# Patient Record
Sex: MALE | Race: WHITE | NOT HISPANIC OR LATINO | Employment: FULL TIME | URBAN - METROPOLITAN AREA
[De-identification: names, ages, dates, MRNs, and addresses within clinical notes are randomized per-mention and may not be internally consistent; named-entity substitution may affect disease eponyms.]

---

## 2017-01-26 ENCOUNTER — TELEPHONE (OUTPATIENT)
Dept: INTERNAL MEDICINE | Facility: CLINIC | Age: 47
End: 2017-01-26

## 2017-01-26 DIAGNOSIS — Z00.00 ROUTINE GENERAL MEDICAL EXAMINATION AT A HEALTH CARE FACILITY: Primary | ICD-10-CM

## 2017-01-30 NOTE — TELEPHONE ENCOUNTER
Patient has to pay out of pocket right now so can you he just have the TSH.  I will call and find out the price.  Please advise.

## 2017-01-31 ENCOUNTER — LAB VISIT (OUTPATIENT)
Dept: LAB | Facility: HOSPITAL | Age: 47
End: 2017-01-31
Attending: INTERNAL MEDICINE
Payer: COMMERCIAL

## 2017-01-31 ENCOUNTER — TELEPHONE (OUTPATIENT)
Dept: INTERNAL MEDICINE | Facility: CLINIC | Age: 47
End: 2017-01-31

## 2017-01-31 DIAGNOSIS — Z00.00 ROUTINE GENERAL MEDICAL EXAMINATION AT A HEALTH CARE FACILITY: ICD-10-CM

## 2017-01-31 LAB — TSH SERPL DL<=0.005 MIU/L-ACNC: 1.97 UIU/ML

## 2017-01-31 PROCEDURE — 36415 COLL VENOUS BLD VENIPUNCTURE: CPT | Mod: PO

## 2017-01-31 PROCEDURE — 84443 ASSAY THYROID STIM HORMONE: CPT

## 2017-01-31 NOTE — TELEPHONE ENCOUNTER
----- Message from Dana Chavarria sent at 1/31/2017 10:25 AM CST -----  Contact: pt  Pt returning missed call, pt can be reached at [phone]//Bob

## 2017-01-31 NOTE — TELEPHONE ENCOUNTER
Patient notified the listed price for [facility] is $102 for the TSH.  Patient verbalized understanding.  Patient is coming in today to have done.

## 2017-06-17 ENCOUNTER — OFFICE VISIT (OUTPATIENT)
Dept: URGENT CARE | Facility: CLINIC | Age: 47
End: 2017-06-17

## 2017-06-17 VITALS
TEMPERATURE: 99 F | HEART RATE: 74 BPM | WEIGHT: 276.69 LBS | SYSTOLIC BLOOD PRESSURE: 140 MMHG | DIASTOLIC BLOOD PRESSURE: 89 MMHG | HEIGHT: 77 IN | OXYGEN SATURATION: 98 % | BODY MASS INDEX: 32.67 KG/M2

## 2017-06-17 DIAGNOSIS — G43.011 INTRACTABLE MIGRAINE WITHOUT AURA AND WITH STATUS MIGRAINOSUS: ICD-10-CM

## 2017-06-17 DIAGNOSIS — R11.10 INTRACTABLE VOMITING, PRESENCE OF NAUSEA NOT SPECIFIED, UNSPECIFIED VOMITING TYPE: Primary | ICD-10-CM

## 2017-06-17 DIAGNOSIS — R10.13 DYSPEPSIA: ICD-10-CM

## 2017-06-17 PROCEDURE — 96372 THER/PROPH/DIAG INJ SC/IM: CPT | Mod: PBBFAC,PO

## 2017-06-17 PROCEDURE — 99999 PR PBB SHADOW E&M-EST. PATIENT-LVL III: CPT | Mod: PBBFAC,,, | Performed by: PHYSICIAN ASSISTANT

## 2017-06-17 PROCEDURE — 99213 OFFICE O/P EST LOW 20 MIN: CPT | Mod: PBBFAC,PO | Performed by: PHYSICIAN ASSISTANT

## 2017-06-17 PROCEDURE — 99213 OFFICE O/P EST LOW 20 MIN: CPT | Mod: S$PBB,,, | Performed by: PHYSICIAN ASSISTANT

## 2017-06-17 RX ORDER — PROMETHAZINE HYDROCHLORIDE 25 MG/ML
25 INJECTION, SOLUTION INTRAMUSCULAR; INTRAVENOUS
Status: COMPLETED | OUTPATIENT
Start: 2017-06-17 | End: 2017-06-17

## 2017-06-17 RX ORDER — PHENOL/SODIUM PHENOLATE
20 AEROSOL, SPRAY (ML) MUCOUS MEMBRANE DAILY
COMMUNITY
Start: 2017-06-17 | End: 2020-08-10

## 2017-06-17 RX ORDER — PROMETHAZINE HYDROCHLORIDE 12.5 MG/1
12.5 TABLET ORAL EVERY 6 HOURS PRN
Qty: 12 TABLET | Refills: 0 | Status: SHIPPED | OUTPATIENT
Start: 2017-06-17 | End: 2018-07-20

## 2017-06-17 RX ORDER — KETOROLAC TROMETHAMINE 30 MG/ML
60 INJECTION, SOLUTION INTRAMUSCULAR; INTRAVENOUS
Status: COMPLETED | OUTPATIENT
Start: 2017-06-17 | End: 2017-06-17

## 2017-06-17 RX ADMIN — KETOROLAC TROMETHAMINE 60 MG: 60 INJECTION, SOLUTION INTRAMUSCULAR at 02:06

## 2017-06-17 RX ADMIN — PROMETHAZINE HYDROCHLORIDE 25 MG: 25 INJECTION INTRAMUSCULAR; INTRAVENOUS at 02:06

## 2017-06-17 NOTE — PROGRESS NOTES
After obtaining consent, and per orders of ZULEYKA Natarajan , injection of phenergan 25 mg given by Ramila Stone. Patient instructed to remain in clinic for 20 minutes afterwards, and to report any adverse reaction to me immediately. * Patient states he did have transportation from the clinic before injection was given. *

## 2017-06-17 NOTE — PATIENT INSTRUCTIONS
Preventing Migraine Headaches: Triggers  The first step in preventing migraines is to learn what triggers them. You may then be able to control your triggers to avoid or reduce the severity of your migraines.     Know your triggers  Be aware that you may have more than one trigger, and that some triggers may work together. Common migraine triggers include:  · Food and nutrition. Skipping meals or not drinking enough water can trigger headaches. So can certain foods, such as caffeine, monosodium glutamate (MSG), aged cheese, or sausage.  · Alcohol. Red wine and other alcoholic beverages are common migraine triggers.  · Chemicals. Scents, cleaning products, gasoline, glue, perfume, and paint can be triggers. So can tobacco smoke, including secondhand smoke.  · Emotions. Stress can trigger headaches or make them worse once they begin.  · Sleep disruption. Staying up late, sleeping late, and traveling across time zones can disrupt your sleep cycle, triggering headaches.  · Hormones. Many women notice that migraines tend to happen at a certain point in their menstrual cycle. Birth control pills or hormone replacement therapy may also trigger migraines.  · Environment and weather. Air travel, changes in altitude, air pressure changes, hot sun, or bright or flashing lights can be triggers.    Control your triggers  These are some of the things you can do to try to control triggers:  · Avoid triggers if you can. For example, stay clear of alcohol and foods that trigger your headaches. Use unscented household products. Keep regular sleep habits. Manage stress to help control emotional triggers.  · Change your behavior at times when triggers can't be avoided. For example, make sure to get enough rest and drink plenty of water while you're traveling. Make sure to carry a hat, sunglasses, and your medicines. Be alert for migraine symptoms, so you can treat a migraine early if it happens.  Date Last Reviewed: 10/9/2015  ©  6095-8252 [employer]. [address] [place] [address], [place] [address]. All rights reserved. This information is not intended as a substitute for professional medical care. Always follow your healthcare professional's instructions.        Preventing Migraine Headaches: Medicines and Lifestyle Changes  A migraine is a type of severe headache. Having a migraine can be very painful. But there are steps you can take to help prevent migraines.    Medicines to help prevent migraines  · Your healthcare provider may prescribe certain medicines to help prevent migraines. These medicines may need to be taken daily. Or they may only need to be taken at times when youre likely to have a migraine.  · Common medicines used to help prevent migraines include:  ¨ Triptans (serotonin receptor agonists)  ¨ Nonsteroidal anti-inflammatory drugs (available over-the-counter)  ¨ Beta-blockers  ¨ Anticonvulsants  ¨ Tricyclic antidepressants  ¨ Calcium channel blockers  ¨ Certain vitamins, minerals, and plant extracts  ¨ Botulinum toxin injection (Botox) for certain chronic migraines   ¨ CGRP (calcitonin gene-related peptide) agnonists are being reviewed by the Food and Drug Administration (FDA)  Lifestyle changes for long-term prevention  Here are some suggestions:  · Exercise. Regular exercise can help prevent migraines and improve your health. (If exercise triggers your migraines, talk to your healthcare provider.)  · Keep regular habits. Dont skip or delay meals. Drink plenty of water. And go to bed and get up at about the same time each day. This includes weekends.  · Try alternative treatments. These are treatments that do not involve the use of medicines or surgery. They may help relieve symptoms and prevent migraines. Some treatment options include biofeedback and acupuncture. Ask your healthcare provider to tell you more about these treatments if you have questions.  · Limit caffeine. You may find that caffeine helps  relieve pain during an attack. But too much caffeine can also trigger migraines. So, limit the amount of caffeine you consume.  Date Last Reviewed: 10/11/2015  © 5208-5757 [employer]. [address] [place] [address], [place] [address]. All rights reserved. This information is not intended as a substitute for professional medical care. Always follow your healthcare professional's instructions.      Go the emergency room if any of the following occur:   Worsening of your headache   Repeated vomiting (unable to keep liquids down)   Fever of 100.4ºF (38ºC) or higher, or as directed by your healthcare provider   Stiff neck   Extreme drowsiness, confusion or fainting   Dizziness, vertigo (dizziness with spinning sensation)   Weakness of an arm or leg or one side of the face   Difficulty with speech or vision

## 2017-06-17 NOTE — PROGRESS NOTES
"Subjective:       Patient ID: Jeff Cameron is a 46 y.o. male.    Chief Complaint: Migraine    Migraine    This is a chronic problem. The current episode started more than 1 month ago (he has a chronic daily headache, "I don't want to get into the headache issue today, I just want something for this vomiting"). The problem occurs constantly. The problem has been unchanged (has been worse for past few weeks and the nausea and vomiting have been bad starting today). The pain is located in the bilateral region. The quality of the pain is described as aching. The pain is severe. Associated symptoms include anorexia, dizziness (slightly lightheaded at times), nausea and vomiting. Pertinent negatives include no abdominal pain (has a discomfort to epigastric region), blurred vision, coughing, ear pain, eye pain, fever, loss of balance, numbness, phonophobia, photophobia, rhinorrhea, sinus pressure, sore throat, visual change or weakness. Nothing aggravates the symptoms. He has tried Excedrin (takes this most days) for the symptoms. The treatment provided no relief. His past medical history is significant for migraine headaches. (Saw a neurologist many years ago but none recently, knows he needs to consult with them but he is "in between insurances right now" and cannot afford it)     Review of Systems   Constitutional: Positive for appetite change. Negative for chills, fatigue and fever.   HENT: Negative for congestion, ear discharge, ear pain, postnasal drip, rhinorrhea, sinus pressure, sneezing and sore throat.    Eyes: Negative for blurred vision, photophobia, pain, discharge and visual disturbance.   Respiratory: Negative for cough, shortness of breath and wheezing.    Cardiovascular: Negative for chest pain and leg swelling.   Gastrointestinal: Positive for anorexia, nausea and vomiting. Negative for abdominal pain (has a discomfort to epigastric region) and diarrhea.   Musculoskeletal: Negative for myalgias. " "  Skin: Negative for rash.   Neurological: Positive for dizziness (slightly lightheaded at times). Negative for weakness, numbness, headaches and loss of balance.       Objective:      BP (!) 140/89   Pulse 74   Temp 98.6 °F (37 °C)   Ht 6' 5" (1.956 m)   Wt 125.5 kg (276 lb 10.8 oz)   SpO2 98%   BMI 32.81 kg/m²   Physical Exam   Constitutional: He is oriented to person, place, and time. He appears well-developed and well-nourished. No distress.   Mild distress secondary to vomiting, patient observed to be primarily expectorating into cup, no emesis observed   HENT:   Head: Normocephalic and atraumatic.   Right Ear: External ear normal.   Left Ear: External ear normal.   Nose: Nose normal.   Eyes: Conjunctivae and EOM are normal. Pupils are equal, round, and reactive to light. Right eye exhibits no discharge. Left eye exhibits no discharge.   Neck: Normal range of motion. Neck supple.   Cardiovascular: Normal rate, regular rhythm, normal heart sounds and intact distal pulses.  Exam reveals no gallop and no friction rub.    No murmur heard.  Pulmonary/Chest: Effort normal and breath sounds normal. No respiratory distress. He has no wheezes. He has no rales.   Abdominal: Soft. Bowel sounds are normal. He exhibits no distension. There is no tenderness. There is no rebound, no guarding and no CVA tenderness.   Neurological: He is alert and oriented to person, place, and time. Coordination and gait normal.   No nystagmus   Skin: Skin is warm and dry. No rash noted. He is not diaphoretic. No erythema.   Vitals reviewed.      Assessment:       1. Intractable vomiting, presence of nausea not specified, unspecified vomiting type    2. Intractable migraine without aura and with status migrainosus        Plan:       Intractable vomiting, presence of nausea not specified, unspecified vomiting type  -     promethazine injection 25 mg; Inject 1 mL (25 mg total) into the muscle one time.    Intractable migraine without aura " and with status migrainosus    Patient specifically requesting phenergan injection, states that oral does not work. Also requested muscle relaxant injection. Was informed do not recommend two sedating medications at once for possible severe side effects. Will proceed with antiemetic. Patient drove himself today and informed he needed to have a ride in order to safely give medication as we cannot allow him to drive given sedation side effects. He stated he will contact someone to drive him home.    Following phenergan dose patient requesting Toradol injection for headache, will proceed with medication for pain control.    Also advised adding PPI due to daily aspirin intake and GI side effects. Offered prescription and patient states prefers to buy OTC.  Advised consultation with Neurology as soon as possible for evaluation and preventative medication.      Heather Trant PA-C [facility]

## 2017-09-18 RX ORDER — LEVOTHYROXINE SODIUM 112 UG/1
TABLET ORAL
Qty: 60 TABLET | Refills: 10 | Status: SHIPPED | OUTPATIENT
Start: 2017-09-18 | End: 2018-02-05 | Stop reason: SDUPTHER

## 2018-02-05 RX ORDER — LEVOTHYROXINE SODIUM 112 UG/1
224 TABLET ORAL DAILY
Qty: 60 TABLET | Refills: 0 | Status: SHIPPED | OUTPATIENT
Start: 2018-02-05 | End: 2018-04-16 | Stop reason: SDUPTHER

## 2018-02-05 NOTE — TELEPHONE ENCOUNTER
Patient stated that the synthroid name brand is now $100.  Patient wants to know if there is a reason why he can not be on the generic, it would only be $15.  He would help him out. Please advise.

## 2018-02-05 NOTE — TELEPHONE ENCOUNTER
----- Message from eBlla Gore sent at 2/5/2018 11:03 AM CST -----  Contact: Patient  Patient called to speak with the nurse; he has some questions about his medication. He can be contacted at [phone].    Thanks,  Bella

## 2018-04-16 RX ORDER — LEVOTHYROXINE SODIUM 112 UG/1
224 TABLET ORAL DAILY
Qty: 60 TABLET | Refills: 0 | Status: SHIPPED | OUTPATIENT
Start: 2018-04-16 | End: 2018-05-17 | Stop reason: SDUPTHER

## 2018-04-16 NOTE — TELEPHONE ENCOUNTER
----- Message from Yasmeen Coley sent at 4/16/2018  9:20 AM CDT -----  Contact: Pt.  Needs a refill for the generic Sinthroid.  Is currently out.  Pharmacy- [facility].  If any questions call pt at [phone].

## 2018-05-17 RX ORDER — LEVOTHYROXINE SODIUM 112 UG/1
224 TABLET ORAL DAILY
Qty: 60 TABLET | Refills: 0 | Status: SHIPPED | OUTPATIENT
Start: 2018-05-17 | End: 2018-06-22 | Stop reason: SDUPTHER

## 2018-06-18 ENCOUNTER — TELEPHONE (OUTPATIENT)
Dept: INTERNAL MEDICINE | Facility: CLINIC | Age: 48
End: 2018-06-18

## 2018-06-18 NOTE — TELEPHONE ENCOUNTER
----- Message from Woody Silva sent at 6/18/2018 11:43 AM CDT -----  Contact: pt  He's calling in regards to a question concerning testosterone levels, [phone] (home)

## 2018-06-22 RX ORDER — LEVOTHYROXINE SODIUM 112 UG/1
224 TABLET ORAL DAILY
Qty: 60 TABLET | Refills: 0 | Status: SHIPPED | OUTPATIENT
Start: 2018-06-22 | End: 2018-06-23 | Stop reason: SDUPTHER

## 2018-06-24 RX ORDER — LEVOTHYROXINE SODIUM 112 UG/1
224 TABLET ORAL DAILY
Qty: 60 TABLET | Refills: 0 | Status: SHIPPED | OUTPATIENT
Start: 2018-06-24 | End: 2018-07-20 | Stop reason: SDUPTHER

## 2018-07-02 DIAGNOSIS — N52.9 ERECTILE DYSFUNCTION, UNSPECIFIED ERECTILE DYSFUNCTION TYPE: ICD-10-CM

## 2018-07-02 RX ORDER — TADALAFIL 5 MG/1
5 TABLET ORAL DAILY PRN
Qty: 30 TABLET | Refills: 0 | Status: SHIPPED | OUTPATIENT
Start: 2018-07-02 | End: 2018-09-05 | Stop reason: SDUPTHER

## 2018-07-02 NOTE — TELEPHONE ENCOUNTER
Patient has not seen Dr. Ortiz in over a year.  Refill is given but the patient needs an appointment for an updated physical.

## 2018-07-02 NOTE — TELEPHONE ENCOUNTER
----- Message from Jesi Sheridan sent at 7/2/2018 12:32 PM CDT -----  Contact: Patient  1. What is the name of the medication you are requesting? Cialis  2. What is the dose? Does not know  3. How do you take the medication? Orally, topically, etc? orally  4. How often do you take this medication? As needed  5. Do you need a 30 day or 90 day supply? 30  6. How many refills are you requesting?   7. What is your preferred pharmacy and location of the pharmacy? [facility]  8. Who can we contact with further questions? Patient at [phone]

## 2018-07-20 ENCOUNTER — OFFICE VISIT (OUTPATIENT)
Dept: INTERNAL MEDICINE | Facility: CLINIC | Age: 48
End: 2018-07-20
Payer: COMMERCIAL

## 2018-07-20 VITALS
WEIGHT: 251.31 LBS | BODY MASS INDEX: 29.67 KG/M2 | DIASTOLIC BLOOD PRESSURE: 80 MMHG | SYSTOLIC BLOOD PRESSURE: 124 MMHG | HEIGHT: 77 IN | TEMPERATURE: 98 F

## 2018-07-20 DIAGNOSIS — E03.9 HYPOTHYROIDISM, UNSPECIFIED TYPE: ICD-10-CM

## 2018-07-20 DIAGNOSIS — R53.83 FATIGUE, UNSPECIFIED TYPE: ICD-10-CM

## 2018-07-20 DIAGNOSIS — F52.0 LACK OF LIBIDO: ICD-10-CM

## 2018-07-20 DIAGNOSIS — Z00.00 ANNUAL PHYSICAL EXAM: Primary | ICD-10-CM

## 2018-07-20 DIAGNOSIS — Z12.11 COLON CANCER SCREENING: ICD-10-CM

## 2018-07-20 PROCEDURE — 90715 TDAP VACCINE 7 YRS/> IM: CPT | Mod: S$GLB,,, | Performed by: FAMILY MEDICINE

## 2018-07-20 PROCEDURE — 99396 PREV VISIT EST AGE 40-64: CPT | Mod: 25,S$GLB,, | Performed by: FAMILY MEDICINE

## 2018-07-20 PROCEDURE — 90471 IMMUNIZATION ADMIN: CPT | Mod: S$GLB,,, | Performed by: FAMILY MEDICINE

## 2018-07-20 PROCEDURE — 99999 PR PBB SHADOW E&M-EST. PATIENT-LVL II: CPT | Mod: PBBFAC,,, | Performed by: FAMILY MEDICINE

## 2018-07-20 RX ORDER — LEVOTHYROXINE SODIUM 112 UG/1
224 TABLET ORAL DAILY
Qty: 60 TABLET | Refills: 11 | Status: SHIPPED | OUTPATIENT
Start: 2018-07-20 | End: 2018-07-30 | Stop reason: SDUPTHER

## 2018-07-20 NOTE — PROGRESS NOTES
"Subjective:      Patient ID: Jeff Cameron is a 47 y.o. male.    Chief Complaint:  Annual    HPI  46 yo male pt of Dr. Ortiz's here to have labs checked.  Specifically, he wants his testosterone levels checked.  He has decreased libido, decreased energy and just feels "his bodily functions" are not right and it may be testosterone.    He has not been seen for annual in a few yrs, due for all labs to be checked.  Dad was diagnosed with advanced colon cancer in his 70s, pt needs colonoscopy.  He has dropped nearly 50 lbs since last visit with Dr. Ortiz.  Eating better, exercising.  No CP/SOB  Bowels loose lately    Past Medical History:   Diagnosis Date    Anxiety     Glycosuria     Hypothyroidism      Family History   Problem Relation Age of Onset    Kidney disease Other         grandfather     Past Surgical History:   Procedure Laterality Date    HERNIA REPAIR      right shoulder surgery      x2     Social History   Substance Use Topics    Smoking status: Former Smoker     Packs/day: 1.00     Types: Cigarettes     Quit date: 3/4/2015    Smokeless tobacco: Not on file    Alcohol use 0.0 oz/week      Comment: drinks vodka irregularly        /80 (BP Location: Right arm, Patient Position: Sitting, BP Method: Large (Manual))   Temp 98.1 °F (36.7 °C) (Tympanic)   Ht 6' 5" (1.956 m)   Wt 114 kg (251 lb 5.2 oz)   BMI 29.80 kg/m²     Review of Systems   Constitutional: Positive for fatigue. Negative for activity change, appetite change, chills, diaphoresis, fever and unexpected weight change.   HENT: Negative for hearing loss and tinnitus.    Eyes: Negative for visual disturbance.   Respiratory: Negative for cough, chest tightness, shortness of breath and wheezing.    Cardiovascular: Negative for chest pain, palpitations and leg swelling.   Gastrointestinal: Negative for abdominal distention and abdominal pain.   Genitourinary: Negative for difficulty urinating and discharge.   Musculoskeletal: " Negative for arthralgias and back pain.   Neurological: Negative for dizziness, weakness and headaches.       Objective:     Physical Exam   Constitutional: He is oriented to person, place, and time. He appears well-developed and well-nourished. No distress.   HENT:   Right Ear: External ear normal.   Left Ear: External ear normal.   Nose: Nose normal.   Mouth/Throat: Oropharynx is clear and moist.   Eyes: Conjunctivae are normal. Pupils are equal, round, and reactive to light.   Neck: Normal range of motion. Neck supple.   Cardiovascular: Normal rate, regular rhythm and normal heart sounds.    No murmur heard.  Pulmonary/Chest: Effort normal and breath sounds normal. No respiratory distress. He has no wheezes.   Abdominal: Soft. Bowel sounds are normal. He exhibits no distension. There is no tenderness. There is no guarding.   Musculoskeletal: He exhibits no edema.   Neurological: He is alert and oriented to person, place, and time. No cranial nerve deficit.   Skin: Skin is warm and dry. No rash noted. He is not diaphoretic.   Psychiatric: He has a normal mood and affect. His behavior is normal. Judgment and thought content normal.   Nursing note and vitals reviewed.      Lab Results   Component Value Date    WBC 4.60 12/03/2015    HGB 13.8 (L) 12/03/2015    HCT 43.7 12/03/2015     12/03/2015    CHOL 170 12/03/2015    TRIG 81 12/03/2015    HDL 44 12/03/2015    ALT 31 12/03/2015    AST 30 12/03/2015     12/03/2015    K 4.4 12/03/2015     12/03/2015    CREATININE 1.1 12/03/2015    BUN 22 (H) 12/03/2015    CO2 26 12/03/2015    TSH 1.969 01/31/2017    PSA 0.55 12/03/2015    HGBA1C 5.6 12/03/2015       Assessment:     1. Annual physical exam    2. Fatigue, unspecified type    3. Lack of libido    4. Colon cancer screening    5. Hypothyroidism, unspecified type         Plan:     Annual physical exam  -     CBC auto differential; Future; Expected date: 07/28/2018  -     Comprehensive metabolic panel;  Future; Expected date: 07/28/2018  -     Hemoglobin A1c; Future; Expected date: 07/28/2018  -     Lipid panel; Future; Expected date: 07/28/2018  -     TSH; Future; Expected date: 07/28/2018  -     PSA, Screening; Future; Expected date: 07/28/2018    Fatigue, unspecified type  -     Testosterone; Future; Expected date: 07/28/2018  -     Testosterone, free; Future; Expected date: 07/28/2018    Lack of libido  -     Testosterone; Future; Expected date: 07/28/2018  -     Testosterone, free; Future; Expected date: 07/28/2018    Colon cancer screening  -     Case request GI: COLONOSCOPY    Hypothyroidism, unspecified type    Other orders  -     (In Office Administered) Tdap Vaccine    Update all labs, [facility]  Update Adacel today  Baseline colonoscopy order in  Healthy diet/exercise encouraged to continue, weight much improved.  If testosterone is abnormal/needs treated will defer to PCP  F/u PRN

## 2018-07-24 ENCOUNTER — DOCUMENTATION ONLY (OUTPATIENT)
Dept: ENDOSCOPY | Facility: HOSPITAL | Age: 48
End: 2018-07-24

## 2018-07-24 NOTE — PROGRESS NOTES
Pt returned call from message left via [employer]. Pt stated he is not able to schedule endoscopy procedure at this time and will call when his scheduled allows for an appt.

## 2018-07-28 ENCOUNTER — LAB VISIT (OUTPATIENT)
Dept: LAB | Facility: HOSPITAL | Age: 48
End: 2018-07-28
Attending: FAMILY MEDICINE
Payer: COMMERCIAL

## 2018-07-28 DIAGNOSIS — R53.83 FATIGUE, UNSPECIFIED TYPE: ICD-10-CM

## 2018-07-28 DIAGNOSIS — F52.0 LACK OF LIBIDO: ICD-10-CM

## 2018-07-28 DIAGNOSIS — Z00.00 ANNUAL PHYSICAL EXAM: ICD-10-CM

## 2018-07-28 LAB
ALBUMIN SERPL BCP-MCNC: 4 G/DL
ALP SERPL-CCNC: 61 U/L
ALT SERPL W/O P-5'-P-CCNC: 14 U/L
ANION GAP SERPL CALC-SCNC: 9 MMOL/L
AST SERPL-CCNC: 17 U/L
BASOPHILS # BLD AUTO: 0.03 K/UL
BASOPHILS NFR BLD: 0.6 %
BILIRUB SERPL-MCNC: 0.7 MG/DL
BUN SERPL-MCNC: 15 MG/DL
CALCIUM SERPL-MCNC: 10.1 MG/DL
CHLORIDE SERPL-SCNC: 106 MMOL/L
CHOLEST SERPL-MCNC: 131 MG/DL
CHOLEST/HDLC SERPL: 3.2 {RATIO}
CO2 SERPL-SCNC: 26 MMOL/L
COMPLEXED PSA SERPL-MCNC: 0.8 NG/ML
CREAT SERPL-MCNC: 1 MG/DL
DIFFERENTIAL METHOD: ABNORMAL
EOSINOPHIL # BLD AUTO: 0.2 K/UL
EOSINOPHIL NFR BLD: 3 %
ERYTHROCYTE [DISTWIDTH] IN BLOOD BY AUTOMATED COUNT: 13.8 %
EST. GFR  (AFRICAN AMERICAN): >60 ML/MIN/1.73 M^2
EST. GFR  (NON AFRICAN AMERICAN): >60 ML/MIN/1.73 M^2
ESTIMATED AVG GLUCOSE: 105 MG/DL
GLUCOSE SERPL-MCNC: 80 MG/DL
HBA1C MFR BLD HPLC: 5.3 %
HCT VFR BLD AUTO: 44.3 %
HDLC SERPL-MCNC: 41 MG/DL
HDLC SERPL: 31.3 %
HGB BLD-MCNC: 14 G/DL
IMM GRANULOCYTES # BLD AUTO: 0.01 K/UL
IMM GRANULOCYTES NFR BLD AUTO: 0.2 %
LDLC SERPL CALC-MCNC: 72.6 MG/DL
LYMPHOCYTES # BLD AUTO: 1.6 K/UL
LYMPHOCYTES NFR BLD: 30 %
MCH RBC QN AUTO: 28.1 PG
MCHC RBC AUTO-ENTMCNC: 31.6 G/DL
MCV RBC AUTO: 89 FL
MONOCYTES # BLD AUTO: 0.5 K/UL
MONOCYTES NFR BLD: 9.6 %
NEUTROPHILS # BLD AUTO: 3 K/UL
NEUTROPHILS NFR BLD: 56.6 %
NONHDLC SERPL-MCNC: 90 MG/DL
NRBC BLD-RTO: 0 /100 WBC
PLATELET # BLD AUTO: 278 K/UL
PMV BLD AUTO: 10.9 FL
POTASSIUM SERPL-SCNC: 4.3 MMOL/L
PROT SERPL-MCNC: 6.9 G/DL
RBC # BLD AUTO: 4.98 M/UL
SODIUM SERPL-SCNC: 141 MMOL/L
T4 FREE SERPL-MCNC: 1.59 NG/DL
TRIGL SERPL-MCNC: 87 MG/DL
TSH SERPL DL<=0.005 MIU/L-ACNC: 0.01 UIU/ML
WBC # BLD AUTO: 5.3 K/UL

## 2018-07-28 PROCEDURE — 84403 ASSAY OF TOTAL TESTOSTERONE: CPT

## 2018-07-28 PROCEDURE — 80061 LIPID PANEL: CPT

## 2018-07-28 PROCEDURE — 36415 COLL VENOUS BLD VENIPUNCTURE: CPT | Mod: PO

## 2018-07-28 PROCEDURE — 85025 COMPLETE CBC W/AUTO DIFF WBC: CPT

## 2018-07-28 PROCEDURE — 84439 ASSAY OF FREE THYROXINE: CPT

## 2018-07-28 PROCEDURE — 84443 ASSAY THYROID STIM HORMONE: CPT

## 2018-07-28 PROCEDURE — 84402 ASSAY OF FREE TESTOSTERONE: CPT

## 2018-07-28 PROCEDURE — 80053 COMPREHEN METABOLIC PANEL: CPT

## 2018-07-28 PROCEDURE — 83036 HEMOGLOBIN GLYCOSYLATED A1C: CPT

## 2018-07-28 PROCEDURE — 84153 ASSAY OF PSA TOTAL: CPT

## 2018-07-30 ENCOUNTER — TELEPHONE (OUTPATIENT)
Dept: INTERNAL MEDICINE | Facility: CLINIC | Age: 48
End: 2018-07-30

## 2018-07-30 DIAGNOSIS — E03.9 HYPOTHYROIDISM, UNSPECIFIED TYPE: Primary | ICD-10-CM

## 2018-07-30 LAB — TESTOST SERPL-MCNC: NORMAL NG/DL

## 2018-07-30 RX ORDER — LEVOTHYROXINE SODIUM 200 UG/1
200 TABLET ORAL
Qty: 90 TABLET | Refills: 3 | Status: SHIPPED | OUTPATIENT
Start: 2018-07-30 | End: 2019-07-10 | Stop reason: SDUPTHER

## 2018-07-30 NOTE — TELEPHONE ENCOUNTER
Let pt know that all labs look ok except that thyroid med is too high.  Need to reduce the dose to 200mcg.  Will send in one pill for this.  Recheck in 8 wks.  Order in.  Waiting on testosterone levels still.

## 2018-07-31 NOTE — TELEPHONE ENCOUNTER
Patient was informed of his results and new medication via phone.  Patient verbalized understanding.  Appointment scheduled for 8 weeks.

## 2018-07-31 NOTE — TELEPHONE ENCOUNTER
----- Message from Renee Roman sent at 7/31/2018 11:58 AM CDT -----  Contact: pt  Pt returning nurse call, please call pt @ [phone].

## 2018-08-01 LAB
MAYO MISCELLANEOUS RESULT (REF): NORMAL
TESTOST FREE SERPL-MCNC: 6.1 PG/ML

## 2018-09-05 DIAGNOSIS — N52.9 ERECTILE DYSFUNCTION, UNSPECIFIED ERECTILE DYSFUNCTION TYPE: ICD-10-CM

## 2018-09-05 RX ORDER — TADALAFIL 5 MG/1
TABLET, FILM COATED ORAL
Qty: 30 TABLET | Refills: 0 | Status: SHIPPED | OUTPATIENT
Start: 2018-09-05 | End: 2018-09-27 | Stop reason: SDUPTHER

## 2018-09-05 NOTE — TELEPHONE ENCOUNTER
----- Message from Dana Chavarria sent at 9/5/2018 10:46 AM CDT -----  Contact: pt  The pt states he needs a refill on his Cialis medication, the pt can be reached at [phone]///thxMW

## 2018-09-27 DIAGNOSIS — N52.9 ERECTILE DYSFUNCTION, UNSPECIFIED ERECTILE DYSFUNCTION TYPE: ICD-10-CM

## 2018-09-27 RX ORDER — TADALAFIL 5 MG/1
TABLET, FILM COATED ORAL
Qty: 30 TABLET | Refills: 0 | Status: SHIPPED | OUTPATIENT
Start: 2018-09-27 | End: 2018-10-22 | Stop reason: SDUPTHER

## 2018-10-22 DIAGNOSIS — N52.9 ERECTILE DYSFUNCTION, UNSPECIFIED ERECTILE DYSFUNCTION TYPE: ICD-10-CM

## 2018-10-23 RX ORDER — TADALAFIL 5 MG/1
5 TABLET ORAL DAILY
Qty: 30 TABLET | Refills: 0 | Status: SHIPPED | OUTPATIENT
Start: 2018-10-23 | End: 2018-12-31 | Stop reason: SDUPTHER

## 2018-10-23 RX ORDER — TADALAFIL 5 MG/1
TABLET, FILM COATED ORAL
Qty: 30 TABLET | Refills: 0 | Status: SHIPPED | OUTPATIENT
Start: 2018-10-23 | End: 2018-10-23 | Stop reason: SDUPTHER

## 2018-10-23 NOTE — TELEPHONE ENCOUNTER
----- Message from Brittney Rizzo sent at 10/23/2018 10:29 AM CDT -----  Contact: self  Pt requesting refill. Please call back at [phone].    1. What is the name of the medication you are requesting? Cialis   2. What is the dose? 5 mg  3. How do you take the medication? Orally, topically, etc? n/a  4. How often do you take this medication? n/a  5. Do you need a 30 day or 90 day supply? n/a  6. How many refills are you requesting? n/a  7. What is your preferred pharmacy and location of the pharmacy?    Pt uses:    [facility] [place]  [address] [place] [id number] [place] [address]  [place] [address]  Phone: [phone] Fax: [phone]      8. Who can we contact with further questions? n/a

## 2018-11-23 DIAGNOSIS — N52.9 ERECTILE DYSFUNCTION, UNSPECIFIED ERECTILE DYSFUNCTION TYPE: ICD-10-CM

## 2018-11-23 RX ORDER — TADALAFIL 5 MG/1
TABLET ORAL
Qty: 30 TABLET | Refills: 0 | OUTPATIENT
Start: 2018-11-23

## 2018-12-31 DIAGNOSIS — N52.9 ERECTILE DYSFUNCTION, UNSPECIFIED ERECTILE DYSFUNCTION TYPE: ICD-10-CM

## 2018-12-31 RX ORDER — TADALAFIL 5 MG/1
5 TABLET ORAL DAILY
Qty: 30 TABLET | Refills: 0 | Status: SHIPPED | OUTPATIENT
Start: 2018-12-31 | End: 2019-04-24

## 2019-04-24 ENCOUNTER — TELEPHONE (OUTPATIENT)
Dept: INTERNAL MEDICINE | Facility: CLINIC | Age: 49
End: 2019-04-24

## 2019-04-24 RX ORDER — SILDENAFIL 50 MG/1
50 TABLET, FILM COATED ORAL DAILY PRN
Qty: 10 TABLET | Refills: 11 | Status: SHIPPED | OUTPATIENT
Start: 2019-04-24 | End: 2021-03-08 | Stop reason: SDUPTHER

## 2019-04-24 NOTE — TELEPHONE ENCOUNTER
----- Message from Susan Narayanan sent at 4/24/2019 12:20 PM CDT -----  Contact: Ms. Gunn-Friend  Patient requesting to stop tadalafil (CIALIS) 5 MG tablet and instead use the generic for Viagra.Please call back at [phone].    Pt uses:  [facility] [place] - [address] [facility]  [address]  [place] [address]  Phone: [phone] Fax: [phone]    Thanks,  Susan Narayanan

## 2019-07-11 RX ORDER — LEVOTHYROXINE SODIUM 200 UG/1
TABLET ORAL
Qty: 90 TABLET | Refills: 3 | Status: SHIPPED | OUTPATIENT
Start: 2019-07-11 | End: 2020-07-16 | Stop reason: SDUPTHER

## 2020-07-16 RX ORDER — LEVOTHYROXINE SODIUM 200 UG/1
TABLET ORAL
Qty: 90 TABLET | Refills: 2 | OUTPATIENT
Start: 2020-07-16

## 2020-07-16 RX ORDER — LEVOTHYROXINE SODIUM 200 UG/1
200 TABLET ORAL
Qty: 30 TABLET | Refills: 0 | Status: SHIPPED | OUTPATIENT
Start: 2020-07-16 | End: 2020-08-12

## 2020-07-16 NOTE — TELEPHONE ENCOUNTER
Called pt and let him know that Dr. Ortiz denied medication and said he would need appt for further refills since he has not been seen since 2015 by Dr. Ortiz.  Saw Dr. Vegas in 2018.  Pt booked for 8- with Dr. Ortiz.  He wants to know if you will send in 3 week supply until he can come in?

## 2020-08-10 ENCOUNTER — OFFICE VISIT (OUTPATIENT)
Dept: INTERNAL MEDICINE | Facility: CLINIC | Age: 50
End: 2020-08-10
Payer: MEDICAID

## 2020-08-10 VITALS
DIASTOLIC BLOOD PRESSURE: 80 MMHG | SYSTOLIC BLOOD PRESSURE: 110 MMHG | HEART RATE: 76 BPM | WEIGHT: 271.19 LBS | BODY MASS INDEX: 32.02 KG/M2 | TEMPERATURE: 98 F | HEIGHT: 77 IN

## 2020-08-10 DIAGNOSIS — Z12.11 COLON CANCER SCREENING: ICD-10-CM

## 2020-08-10 DIAGNOSIS — Z00.00 ROUTINE GENERAL MEDICAL EXAMINATION AT HEALTH CARE FACILITY: Primary | ICD-10-CM

## 2020-08-10 DIAGNOSIS — E03.9 HYPOTHYROIDISM, UNSPECIFIED TYPE: ICD-10-CM

## 2020-08-10 DIAGNOSIS — F41.9 ANXIETY: ICD-10-CM

## 2020-08-10 PROCEDURE — 99213 OFFICE O/P EST LOW 20 MIN: CPT | Mod: PBBFAC,PO | Performed by: INTERNAL MEDICINE

## 2020-08-10 PROCEDURE — 99396 PREV VISIT EST AGE 40-64: CPT | Mod: S$PBB,,, | Performed by: INTERNAL MEDICINE

## 2020-08-10 PROCEDURE — 99999 PR PBB SHADOW E&M-EST. PATIENT-LVL III: CPT | Mod: PBBFAC,,, | Performed by: INTERNAL MEDICINE

## 2020-08-10 PROCEDURE — 99396 PR PREVENTIVE VISIT,EST,40-64: ICD-10-PCS | Mod: S$PBB,,, | Performed by: INTERNAL MEDICINE

## 2020-08-10 PROCEDURE — 99999 PR PBB SHADOW E&M-EST. PATIENT-LVL III: ICD-10-PCS | Mod: PBBFAC,,, | Performed by: INTERNAL MEDICINE

## 2020-08-10 RX ORDER — LORAZEPAM 1 MG/1
1 TABLET ORAL EVERY 6 HOURS PRN
COMMUNITY
End: 2020-08-10

## 2020-08-10 RX ORDER — VORTIOXETINE 10 MG/1
10 TABLET, FILM COATED ORAL
COMMUNITY
End: 2022-08-01

## 2020-08-10 RX ORDER — LURASIDONE HYDROCHLORIDE 60 MG/1
60 TABLET, FILM COATED ORAL DAILY
COMMUNITY
End: 2022-08-01

## 2020-08-10 RX ORDER — BENZTROPINE MESYLATE 1 MG/1
1 TABLET ORAL 2 TIMES DAILY
COMMUNITY
End: 2022-08-01

## 2020-08-10 NOTE — PATIENT INSTRUCTIONS

## 2020-08-10 NOTE — PROGRESS NOTES
"Subjective:       Patient ID: eJff Cameron is a 49 y.o. male.    Chief Complaint: Annual Exam    HPI Patient is a 49-year-old male presented for updated physical exam review of chronic health issues.  Patient has been away from the office for several years.  He has been continuously following with his prescribing psychologist for his anxiety issues but he recognize that he is overdue for health maintenance issues.  He comes in today to update on those issues as well as to get updated lab work scheduled and colonoscopy.  He notes no acute complaints at this time.  He has been tolerating his medications well.    Review of Systems   Constitutional: Negative for fever and unexpected weight change.   HENT: Negative for hearing loss, postnasal drip and rhinorrhea.    Eyes: Negative for pain and visual disturbance.   Respiratory: Negative for cough, shortness of breath and wheezing.    Cardiovascular: Negative for chest pain and palpitations.   Gastrointestinal: Negative for constipation, diarrhea, nausea and vomiting.   Genitourinary: Negative for dysuria and hematuria.   Musculoskeletal: Negative for arthralgias, back pain, myalgias and neck stiffness.   Skin: Negative for pallor and rash.   Neurological: Negative for seizures, syncope and headaches.   Hematological: Negative for adenopathy.   Psychiatric/Behavioral: Negative for dysphoric mood. The patient is not nervous/anxious.        Objective:   /80   Pulse 76   Temp 98 °F (36.7 °C)   Ht 6' 5" (1.956 m)   Wt 123 kg (271 lb 2.7 oz)   BMI 32.16 kg/m²      Physical Exam  Vitals signs reviewed.   Constitutional:       General: He is not in acute distress.     Appearance: He is well-developed.   HENT:      Head: Normocephalic and atraumatic.      Right Ear: Tympanic membrane and ear canal normal.      Left Ear: Tympanic membrane and ear canal normal.   Eyes:      Pupils: Pupils are equal, round, and reactive to light.   Neck:      Musculoskeletal: " Normal range of motion and neck supple.      Thyroid: No thyromegaly.      Vascular: No JVD.   Cardiovascular:      Rate and Rhythm: Normal rate and regular rhythm.      Heart sounds: Normal heart sounds. No murmur. No friction rub. No gallop.    Pulmonary:      Effort: Pulmonary effort is normal.      Breath sounds: Normal breath sounds. No wheezing or rales.   Abdominal:      General: Bowel sounds are normal. There is no distension.      Palpations: Abdomen is soft.      Tenderness: There is no abdominal tenderness. There is no guarding or rebound.   Musculoskeletal: Normal range of motion.   Lymphadenopathy:      Cervical: No cervical adenopathy.   Skin:     General: Skin is warm and dry.      Findings: No rash.   Neurological:      General: No focal deficit present.      Mental Status: He is alert and oriented to person, place, and time.      Cranial Nerves: No cranial nerve deficit.      Deep Tendon Reflexes: Reflexes are normal and symmetric.   Psychiatric:         Mood and Affect: Mood normal.         Judgment: Judgment normal.             Assessment:       1. Routine general medical examination at health care facility    2. Anxiety    3. Hypothyroidism, unspecified type    4. Colon cancer screening        Plan:   No problem-specific Assessment & Plan notes found for this encounter.    Routine general medical examination at health care facility  Comments:  Focus on good health habits, low fat diet, regular exercise, seatbelt use, sunscreen use  Orders:  -     CBC auto differential; Future; Expected date: 08/11/2020  -     Comprehensive metabolic panel; Future; Expected date: 08/11/2020  -     Lipid Panel; Future; Expected date: 08/11/2020  -     PSA, Screening; Future; Expected date: 08/11/2020  -     Hepatitis C Antibody; Future; Expected date: 08/10/2020  -     HIV 1/2 Ag/Ab (4th Gen); Future; Expected date: 08/10/2020  -     TSH; Future; Expected date: 08/11/2020    Anxiety  Comments:  Continue with  prescribing psychologist. stable on meds    Hypothyroidism, unspecified type  Comments:  Continue replacement, update labs    Colon cancer screening  -     Case request GI: COLONOSCOPY; Future; Expected date: 08/11/2020          Follow up in about 1 year (around 8/10/2021).

## 2020-08-11 ENCOUNTER — LAB VISIT (OUTPATIENT)
Dept: LAB | Facility: HOSPITAL | Age: 50
End: 2020-08-11
Attending: INTERNAL MEDICINE
Payer: MEDICAID

## 2020-08-11 DIAGNOSIS — Z00.00 ROUTINE GENERAL MEDICAL EXAMINATION AT HEALTH CARE FACILITY: ICD-10-CM

## 2020-08-11 LAB
BASOPHILS # BLD AUTO: 0.04 K/UL (ref 0–0.2)
BASOPHILS NFR BLD: 0.7 % (ref 0–1.9)
DIFFERENTIAL METHOD: ABNORMAL
EOSINOPHIL # BLD AUTO: 0.2 K/UL (ref 0–0.5)
EOSINOPHIL NFR BLD: 2.9 % (ref 0–8)
ERYTHROCYTE [DISTWIDTH] IN BLOOD BY AUTOMATED COUNT: 13.1 % (ref 11.5–14.5)
HCT VFR BLD AUTO: 50.5 % (ref 40–54)
HGB BLD-MCNC: 15.4 G/DL (ref 14–18)
IMM GRANULOCYTES # BLD AUTO: 0.01 K/UL (ref 0–0.04)
IMM GRANULOCYTES NFR BLD AUTO: 0.2 % (ref 0–0.5)
LYMPHOCYTES # BLD AUTO: 2.3 K/UL (ref 1–4.8)
LYMPHOCYTES NFR BLD: 40.6 % (ref 18–48)
MCH RBC QN AUTO: 27.8 PG (ref 27–31)
MCHC RBC AUTO-ENTMCNC: 30.5 G/DL (ref 32–36)
MCV RBC AUTO: 91 FL (ref 82–98)
MONOCYTES # BLD AUTO: 0.5 K/UL (ref 0.3–1)
MONOCYTES NFR BLD: 8.5 % (ref 4–15)
NEUTROPHILS # BLD AUTO: 2.6 K/UL (ref 1.8–7.7)
NEUTROPHILS NFR BLD: 47.1 % (ref 38–73)
NRBC BLD-RTO: 0 /100 WBC
PLATELET # BLD AUTO: 268 K/UL (ref 150–350)
PMV BLD AUTO: 10.4 FL (ref 9.2–12.9)
RBC # BLD AUTO: 5.54 M/UL (ref 4.6–6.2)
WBC # BLD AUTO: 5.54 K/UL (ref 3.9–12.7)

## 2020-08-11 PROCEDURE — 86803 HEPATITIS C AB TEST: CPT

## 2020-08-11 PROCEDURE — 86703 HIV-1/HIV-2 1 RESULT ANTBDY: CPT

## 2020-08-11 PROCEDURE — 80061 LIPID PANEL: CPT

## 2020-08-11 PROCEDURE — 36415 COLL VENOUS BLD VENIPUNCTURE: CPT | Mod: PO

## 2020-08-11 PROCEDURE — 84443 ASSAY THYROID STIM HORMONE: CPT

## 2020-08-11 PROCEDURE — 80053 COMPREHEN METABOLIC PANEL: CPT

## 2020-08-11 PROCEDURE — 85025 COMPLETE CBC W/AUTO DIFF WBC: CPT

## 2020-08-11 PROCEDURE — 84153 ASSAY OF PSA TOTAL: CPT

## 2020-08-11 PROCEDURE — 84439 ASSAY OF FREE THYROXINE: CPT

## 2020-08-12 ENCOUNTER — TELEPHONE (OUTPATIENT)
Dept: INTERNAL MEDICINE | Facility: CLINIC | Age: 50
End: 2020-08-12

## 2020-08-12 DIAGNOSIS — E03.9 HYPOTHYROIDISM, UNSPECIFIED TYPE: Primary | ICD-10-CM

## 2020-08-12 LAB
ALBUMIN SERPL BCP-MCNC: 4.3 G/DL (ref 3.5–5.2)
ALP SERPL-CCNC: 69 U/L (ref 55–135)
ALT SERPL W/O P-5'-P-CCNC: 38 U/L (ref 10–44)
ANION GAP SERPL CALC-SCNC: 9 MMOL/L (ref 8–16)
AST SERPL-CCNC: 26 U/L (ref 10–40)
BILIRUB SERPL-MCNC: 0.4 MG/DL (ref 0.1–1)
BUN SERPL-MCNC: 23 MG/DL (ref 6–20)
CALCIUM SERPL-MCNC: 9.8 MG/DL (ref 8.7–10.5)
CHLORIDE SERPL-SCNC: 106 MMOL/L (ref 95–110)
CHOLEST SERPL-MCNC: 211 MG/DL (ref 120–199)
CHOLEST/HDLC SERPL: 4.1 {RATIO} (ref 2–5)
CO2 SERPL-SCNC: 26 MMOL/L (ref 23–29)
COMPLEXED PSA SERPL-MCNC: 0.57 NG/ML (ref 0–4)
CREAT SERPL-MCNC: 1.2 MG/DL (ref 0.5–1.4)
EST. GFR  (AFRICAN AMERICAN): >60 ML/MIN/1.73 M^2
EST. GFR  (NON AFRICAN AMERICAN): >60 ML/MIN/1.73 M^2
GLUCOSE SERPL-MCNC: 91 MG/DL (ref 70–110)
HDLC SERPL-MCNC: 52 MG/DL (ref 40–75)
HDLC SERPL: 24.6 % (ref 20–50)
HIV 1+2 AB+HIV1 P24 AG SERPL QL IA: NEGATIVE
LDLC SERPL CALC-MCNC: 141.4 MG/DL (ref 63–159)
NONHDLC SERPL-MCNC: 159 MG/DL
POTASSIUM SERPL-SCNC: 4.3 MMOL/L (ref 3.5–5.1)
PROT SERPL-MCNC: 7.6 G/DL (ref 6–8.4)
SODIUM SERPL-SCNC: 141 MMOL/L (ref 136–145)
T4 FREE SERPL-MCNC: 1.43 NG/DL (ref 0.71–1.51)
TRIGL SERPL-MCNC: 88 MG/DL (ref 30–150)
TSH SERPL DL<=0.005 MIU/L-ACNC: 0.09 UIU/ML (ref 0.4–4)

## 2020-08-12 RX ORDER — LEVOTHYROXINE SODIUM 175 UG/1
175 TABLET ORAL
Qty: 90 TABLET | Refills: 3 | Status: SHIPPED | OUTPATIENT
Start: 2020-08-12 | End: 2021-07-31

## 2020-08-12 NOTE — TELEPHONE ENCOUNTER
Thyroid is out of range.    Decrease synthroid to 175 mcg daily. Repeat tsh in 3 months    Cholesterol is up a bit.  Work on a low fat diet and regular exercise.

## 2020-08-13 LAB — HCV AB SERPL QL IA: NEGATIVE

## 2020-08-13 NOTE — TELEPHONE ENCOUNTER
Patient advised of Dr Ortiz say and verbalized understanding says he will call back when he is ready to schedule lab.

## 2021-03-08 RX ORDER — SILDENAFIL 50 MG/1
50 TABLET, FILM COATED ORAL DAILY PRN
Qty: 10 TABLET | Refills: 11 | Status: SHIPPED | OUTPATIENT
Start: 2021-03-08 | End: 2021-03-10

## 2021-07-08 ENCOUNTER — TELEPHONE (OUTPATIENT)
Dept: INTERNAL MEDICINE | Facility: CLINIC | Age: 51
End: 2021-07-08

## 2022-03-22 ENCOUNTER — TELEPHONE (OUTPATIENT)
Dept: PEDIATRICS | Facility: CLINIC | Age: 52
End: 2022-03-22
Payer: COMMERCIAL

## 2022-03-22 NOTE — TELEPHONE ENCOUNTER
Spoke with patient, and patient states that he been having really bad stomach pains. Scheduled patient on March 31st.

## 2022-03-22 NOTE — TELEPHONE ENCOUNTER
----- Message from Betsy Grimaldo sent at 3/22/2022  2:45 PM CDT -----  .Type:  Sooner Apoointment Request    Caller is requesting a sooner appointment.  Caller declined first available appointment listed below.  Caller will not accept being placed on the waitlist and is requesting a message be sent to doctor.  Name of Caller:.Jeff Cameron    When is the first available appointment?05/2022  Symptoms:check-up  Would the patient rather a call back or a response via [facility]? Call guevara  [facility] Call Back Number:.[phone]    Additional Information:

## 2022-03-23 ENCOUNTER — TELEPHONE (OUTPATIENT)
Dept: INTERNAL MEDICINE | Facility: CLINIC | Age: 52
End: 2022-03-23
Payer: COMMERCIAL

## 2022-03-23 NOTE — TELEPHONE ENCOUNTER
----- Message from Yvonne Hernandez sent at 3/23/2022 12:05 PM CDT -----  .Type:  Sooner Apoointment Request    Caller is requesting a sooner appointment.  Caller declined first available appointment listed below.  Caller will not accept being placed on the waitlist and is requesting a message be sent to doctor.  Name of Caller: SARY PELAEZ [[id number]]  When is the first available appointment? 03/31  Symptoms: stomach pains   Would the patient rather a call back or a response via [facility]?  [facility] Call Back Number:  [phone]  Additional Information:  Pt is asking for  a later time on 03/31. Pt states 9 am is too early

## 2022-03-23 NOTE — TELEPHONE ENCOUNTER
Called patient, and patient stated that he won't be able to make that appointment. I informed patient that if he didn't come that day; he won't ne able to get an appointment with Dr. Ortiz until May. Patient is seeing Rahel on April 1st.

## 2022-05-06 NOTE — TELEPHONE ENCOUNTER
----- Message from Micheal Hubbard sent at 5/6/2022 11:49 AM CDT -----  Contact: self  Type:  RX Refill Request    Who Called: Jeff Cameron   Refill or New Rx:Refill  RX Name and Strength:sildenafiL (VIAGRA) 50 MG tablet  How is the patient currently taking it? (ex. 1XDay):as needed  Is this a 30 day or 90 day RX:30  Preferred Pharmacy with phone number:  [facility] - [place] - [address]  [address] [place] [address]  Phone: [phone] Fax: [phone]  Local or Mail Order:Local  Ordering Provider:Diana  Would the patient rather a call back or a response via [facility]? Call rusty  Best Call Back Number:[phone]  Additional Information:

## 2022-05-06 NOTE — TELEPHONE ENCOUNTER
Refill Routing Note   Medication(s) are not appropriate for processing by [facility] for the following reason(s):      - Patient has not been seen in over 15 months by PCP  - Required vitals are outdated    [facility] action(s):  Defer          Medication reconciliation completed: No     Appointments  past 12m or future 3m with PCP    Date Provider   Last Visit   8/10/2020 Wilber Ortiz MD   Next Visit   Visit date not found Wilber Ortiz MD   ED visits in past 90 days: 0        Note composed:5:35 PM 05/06/2022

## 2022-05-09 ENCOUNTER — TELEPHONE (OUTPATIENT)
Dept: INTERNAL MEDICINE | Facility: CLINIC | Age: 52
End: 2022-05-09
Payer: COMMERCIAL

## 2022-05-09 RX ORDER — SILDENAFIL 50 MG/1
TABLET, FILM COATED ORAL
Qty: 10 TABLET | Refills: 0 | Status: SHIPPED | OUTPATIENT
Start: 2022-05-09 | End: 2022-06-14

## 2022-05-09 NOTE — TELEPHONE ENCOUNTER
Patient overdue for follow up.  Refill is given but the patient needs an appointment with Dr. Ortiz or Dayna Quintana for follow up.

## 2022-05-09 NOTE — TELEPHONE ENCOUNTER
----- Message from Echo Naik sent at 5/9/2022  8:24 AM CDT -----  Contact: Cayden Aguilar got a missed a call with no message and he think the call may have came from this office about his medication. Please call him at [phone].    Thanks  Td

## 2022-05-26 ENCOUNTER — PATIENT OUTREACH (OUTPATIENT)
Dept: ADMINISTRATIVE | Facility: HOSPITAL | Age: 52
End: 2022-05-26
Payer: COMMERCIAL

## 2022-05-26 NOTE — PROGRESS NOTES
Not seen in 12 months report: Patient has an appointment scheduled with PCP on 8/1/22 for follow up.

## 2022-06-13 NOTE — TELEPHONE ENCOUNTER
No new care gaps identified.  [facility] Embedded Care Gaps. Reference number: [id number]. 6/13/2022   9:56:50 AM CDT

## 2022-06-14 RX ORDER — SILDENAFIL 50 MG/1
TABLET, FILM COATED ORAL
Qty: 10 TABLET | Refills: 0 | Status: SHIPPED | OUTPATIENT
Start: 2022-06-14 | End: 2022-07-26

## 2022-06-14 NOTE — TELEPHONE ENCOUNTER
Refill Routing Note   Medication(s) are not appropriate for processing by [facility] for the following reason(s):      - Patient has not been seen in over 15 months by PCP  - Required vitals are outdated    [facility] action(s):  Defer Medication-related problems identified: Requires appointment        Medication reconciliation completed: No     Appointments  past 12m or future 3m with PCP    Date Provider   Last Visit   8/10/2020 Wilber Ortiz MD   Next Visit   8/1/2022 Wilber Ortiz MD   ED visits in past 90 days: 0        Note composed:7:28 AM 06/14/2022

## 2022-08-01 ENCOUNTER — OFFICE VISIT (OUTPATIENT)
Dept: INTERNAL MEDICINE | Facility: CLINIC | Age: 52
End: 2022-08-01
Payer: MEDICAID

## 2022-08-01 VITALS
TEMPERATURE: 98 F | WEIGHT: 248.25 LBS | HEART RATE: 87 BPM | OXYGEN SATURATION: 97 % | DIASTOLIC BLOOD PRESSURE: 80 MMHG | BODY MASS INDEX: 29.31 KG/M2 | SYSTOLIC BLOOD PRESSURE: 128 MMHG | HEIGHT: 77 IN

## 2022-08-01 DIAGNOSIS — E06.3 HYPOTHYROIDISM DUE TO HASHIMOTO'S THYROIDITIS: ICD-10-CM

## 2022-08-01 DIAGNOSIS — Z12.11 COLON CANCER SCREENING: ICD-10-CM

## 2022-08-01 DIAGNOSIS — F41.9 ANXIETY: ICD-10-CM

## 2022-08-01 DIAGNOSIS — E03.8 HYPOTHYROIDISM DUE TO HASHIMOTO'S THYROIDITIS: ICD-10-CM

## 2022-08-01 DIAGNOSIS — Z00.00 ROUTINE GENERAL MEDICAL EXAMINATION AT HEALTH CARE FACILITY: Primary | ICD-10-CM

## 2022-08-01 PROCEDURE — 99396 PR PREVENTIVE VISIT,EST,40-64: ICD-10-PCS | Mod: S$PBB,,, | Performed by: INTERNAL MEDICINE

## 2022-08-01 PROCEDURE — 99396 PREV VISIT EST AGE 40-64: CPT | Mod: S$PBB,,, | Performed by: INTERNAL MEDICINE

## 2022-08-01 PROCEDURE — 99999 PR PBB SHADOW E&M-EST. PATIENT-LVL IV: ICD-10-PCS | Mod: PBBFAC,,, | Performed by: INTERNAL MEDICINE

## 2022-08-01 PROCEDURE — 99999 PR PBB SHADOW E&M-EST. PATIENT-LVL IV: CPT | Mod: PBBFAC,,, | Performed by: INTERNAL MEDICINE

## 2022-08-01 PROCEDURE — 99214 OFFICE O/P EST MOD 30 MIN: CPT | Mod: PBBFAC,PO | Performed by: INTERNAL MEDICINE

## 2022-08-01 RX ORDER — HYOSCYAMINE SULFATE 0.125 MG
125 TABLET ORAL EVERY 8 HOURS PRN
COMMUNITY
Start: 2022-03-22 | End: 2022-08-01

## 2022-08-01 RX ORDER — DICLOFENAC SODIUM 75 MG/1
75 TABLET, DELAYED RELEASE ORAL 2 TIMES DAILY
COMMUNITY
Start: 2022-06-20 | End: 2022-08-01

## 2022-08-01 RX ORDER — CYCLOBENZAPRINE HCL 10 MG
10 TABLET ORAL 3 TIMES DAILY PRN
COMMUNITY
Start: 2022-06-20 | End: 2022-08-01

## 2022-08-01 RX ORDER — QUETIAPINE 300 MG/1
300 TABLET, FILM COATED, EXTENDED RELEASE ORAL NIGHTLY
COMMUNITY
Start: 2022-05-31 | End: 2023-08-03 | Stop reason: SDUPTHER

## 2022-08-01 NOTE — PROGRESS NOTES
"Subjective:       Patient ID: Jeff Cameron is a 51 y.o. male.    Chief Complaint: Follow-up    HPI Patient is a 51-year-old male presenting today for updated physical exam review of chronic health issues.  Patient has history of hypothyroidism, anxiety and a family history of colon cancer.  Regards to his hypothyroidism he continues take his thyroid replacement he is having no issues with it.  He is due for some updated labs.  In regards anxiety continues to follow with outside behavioral health team and he states that is been doing very well.  He is on Seroquel but mainly just to help with sleep.  He has not had much issues with the anxiety at this point.    Patient is overdue for colon cancer screening.  He has a family history of colon cancer his father  in 2019 of colon cancer at the age of 74.  He is aware the need for screening the importance of doing so.  He is currently without insurance and indicates that he just can not do it without insurance coverage.    Review of Systems   Constitutional: Negative for fever and unexpected weight change.   HENT: Negative for hearing loss, postnasal drip and rhinorrhea.    Eyes: Negative for pain and visual disturbance.   Respiratory: Negative for cough, shortness of breath and wheezing.    Cardiovascular: Negative for chest pain and palpitations.   Gastrointestinal: Negative for constipation, diarrhea, nausea and vomiting.   Genitourinary: Negative for dysuria and hematuria.   Musculoskeletal: Negative for arthralgias, back pain, myalgias and neck stiffness.   Skin: Negative for pallor and rash.   Neurological: Negative for seizures, syncope and headaches.   Hematological: Negative for adenopathy.   Psychiatric/Behavioral: Negative for dysphoric mood. The patient is not nervous/anxious.        Objective:   /80   Pulse 87   Temp 98.1 °F (36.7 °C) (Temporal)   Ht 6' 5" (1.956 m)   Wt 112.6 kg (248 lb 3.8 oz)   SpO2 97%   BMI 29.44 kg/m²      Physical " Exam  Vitals reviewed.   Constitutional:       General: He is not in acute distress.     Appearance: He is well-developed.   HENT:      Head: Normocephalic and atraumatic.      Right Ear: Tympanic membrane and ear canal normal.      Left Ear: Tympanic membrane and ear canal normal.   Eyes:      Pupils: Pupils are equal, round, and reactive to light.   Neck:      Thyroid: No thyromegaly.      Vascular: No JVD.   Cardiovascular:      Rate and Rhythm: Normal rate and regular rhythm.      Heart sounds: Normal heart sounds. No murmur heard.    No friction rub. No gallop.   Pulmonary:      Effort: Pulmonary effort is normal.      Breath sounds: Normal breath sounds. No wheezing or rales.   Abdominal:      General: Bowel sounds are normal. There is no distension.      Palpations: Abdomen is soft.      Tenderness: There is no abdominal tenderness. There is no guarding or rebound.   Musculoskeletal:         General: Normal range of motion.      Cervical back: Normal range of motion and neck supple.   Lymphadenopathy:      Cervical: No cervical adenopathy.   Skin:     General: Skin is warm and dry.      Findings: No rash.   Neurological:      General: No focal deficit present.      Mental Status: He is alert and oriented to person, place, and time.      Cranial Nerves: No cranial nerve deficit.      Deep Tendon Reflexes: Reflexes are normal and symmetric.   Psychiatric:         Mood and Affect: Mood normal.         Judgment: Judgment normal.             Assessment:       1. Routine general medical examination at health care facility    2. Hypothyroidism due to Hashimoto's thyroiditis    3. Anxiety    4. Colon cancer screening        Plan:   No problem-specific Assessment & Plan notes found for this encounter.    Routine general medical examination at health care facility  Comments:  Focus on good health habits, low fat diet, regular exercise, seatbelt use, sunscreen use  Orders:  -     CBC Auto Differential; Future; Expected  date: 08/01/2022  -     Comprehensive Metabolic Panel; Future; Expected date: 08/01/2022  -     Lipid Panel; Future; Expected date: 08/01/2022  -     PSA, Screening; Future; Expected date: 08/01/2022    Hypothyroidism due to Hashimoto's thyroiditis  Comments:  continue meds, update labs  Orders:  -     TSH; Future; Expected date: 08/01/2022    Anxiety  Comments:  Continue with behavioral health provider, stable on seroquel    Colon cancer screening  Comments:  Will let us know when insurance kicks in so we can get him scoped    Due to his increased risk from the family history he is not a good candidate for a fit kit or Cologuard testing.  Colonoscopy is as best screening tool for colon cancer.  I discussed the importance of doing this once he gets insurance he expresses understanding.      Follow up in about 1 year (around 8/1/2023).

## 2022-08-15 ENCOUNTER — LAB VISIT (OUTPATIENT)
Dept: LAB | Facility: HOSPITAL | Age: 52
End: 2022-08-15
Attending: INTERNAL MEDICINE
Payer: MEDICAID

## 2022-08-15 DIAGNOSIS — E03.8 HYPOTHYROIDISM DUE TO HASHIMOTO'S THYROIDITIS: ICD-10-CM

## 2022-08-15 DIAGNOSIS — Z00.00 ROUTINE GENERAL MEDICAL EXAMINATION AT HEALTH CARE FACILITY: ICD-10-CM

## 2022-08-15 DIAGNOSIS — E06.3 HYPOTHYROIDISM DUE TO HASHIMOTO'S THYROIDITIS: ICD-10-CM

## 2022-08-15 PROCEDURE — 84443 ASSAY THYROID STIM HORMONE: CPT | Performed by: INTERNAL MEDICINE

## 2022-08-15 PROCEDURE — 85025 COMPLETE CBC W/AUTO DIFF WBC: CPT | Performed by: INTERNAL MEDICINE

## 2022-08-15 PROCEDURE — 84153 ASSAY OF PSA TOTAL: CPT | Performed by: INTERNAL MEDICINE

## 2022-08-15 PROCEDURE — 84439 ASSAY OF FREE THYROXINE: CPT | Performed by: INTERNAL MEDICINE

## 2022-08-15 PROCEDURE — 80053 COMPREHEN METABOLIC PANEL: CPT | Performed by: INTERNAL MEDICINE

## 2022-08-15 PROCEDURE — 36415 COLL VENOUS BLD VENIPUNCTURE: CPT | Mod: PO | Performed by: INTERNAL MEDICINE

## 2022-08-15 PROCEDURE — 80061 LIPID PANEL: CPT | Performed by: INTERNAL MEDICINE

## 2022-08-16 ENCOUNTER — TELEPHONE (OUTPATIENT)
Dept: INTERNAL MEDICINE | Facility: CLINIC | Age: 52
End: 2022-08-16
Payer: MEDICAID

## 2022-08-16 DIAGNOSIS — E03.9 HYPOTHYROIDISM, UNSPECIFIED TYPE: ICD-10-CM

## 2022-08-16 LAB
ALBUMIN SERPL BCP-MCNC: 4.1 G/DL (ref 3.5–5.2)
ALP SERPL-CCNC: 54 U/L (ref 55–135)
ALT SERPL W/O P-5'-P-CCNC: 27 U/L (ref 10–44)
ANION GAP SERPL CALC-SCNC: 11 MMOL/L (ref 8–16)
AST SERPL-CCNC: 22 U/L (ref 10–40)
BASOPHILS # BLD AUTO: 0.03 K/UL (ref 0–0.2)
BASOPHILS NFR BLD: 0.7 % (ref 0–1.9)
BILIRUB SERPL-MCNC: 0.7 MG/DL (ref 0.1–1)
BUN SERPL-MCNC: 19 MG/DL (ref 6–20)
CALCIUM SERPL-MCNC: 9.7 MG/DL (ref 8.7–10.5)
CHLORIDE SERPL-SCNC: 105 MMOL/L (ref 95–110)
CHOLEST SERPL-MCNC: 183 MG/DL (ref 120–199)
CHOLEST/HDLC SERPL: 3.6 {RATIO} (ref 2–5)
CO2 SERPL-SCNC: 27 MMOL/L (ref 23–29)
COMPLEXED PSA SERPL-MCNC: 0.75 NG/ML (ref 0–4)
CREAT SERPL-MCNC: 1 MG/DL (ref 0.5–1.4)
DIFFERENTIAL METHOD: ABNORMAL
EOSINOPHIL # BLD AUTO: 0.2 K/UL (ref 0–0.5)
EOSINOPHIL NFR BLD: 3.6 % (ref 0–8)
ERYTHROCYTE [DISTWIDTH] IN BLOOD BY AUTOMATED COUNT: 13.6 % (ref 11.5–14.5)
EST. GFR  (NO RACE VARIABLE): >60 ML/MIN/1.73 M^2
GLUCOSE SERPL-MCNC: 80 MG/DL (ref 70–110)
HCT VFR BLD AUTO: 45.1 % (ref 40–54)
HDLC SERPL-MCNC: 51 MG/DL (ref 40–75)
HDLC SERPL: 27.9 % (ref 20–50)
HGB BLD-MCNC: 14.4 G/DL (ref 14–18)
IMM GRANULOCYTES # BLD AUTO: 0 K/UL (ref 0–0.04)
IMM GRANULOCYTES NFR BLD AUTO: 0 % (ref 0–0.5)
LDLC SERPL CALC-MCNC: 118.4 MG/DL (ref 63–159)
LYMPHOCYTES # BLD AUTO: 1.9 K/UL (ref 1–4.8)
LYMPHOCYTES NFR BLD: 41.3 % (ref 18–48)
MCH RBC QN AUTO: 27.7 PG (ref 27–31)
MCHC RBC AUTO-ENTMCNC: 31.9 G/DL (ref 32–36)
MCV RBC AUTO: 87 FL (ref 82–98)
MONOCYTES # BLD AUTO: 0.4 K/UL (ref 0.3–1)
MONOCYTES NFR BLD: 9.6 % (ref 4–15)
NEUTROPHILS # BLD AUTO: 2 K/UL (ref 1.8–7.7)
NEUTROPHILS NFR BLD: 44.8 % (ref 38–73)
NONHDLC SERPL-MCNC: 132 MG/DL
NRBC BLD-RTO: 0 /100 WBC
PLATELET # BLD AUTO: 211 K/UL (ref 150–450)
PMV BLD AUTO: 10.1 FL (ref 9.2–12.9)
POTASSIUM SERPL-SCNC: 4.4 MMOL/L (ref 3.5–5.1)
PROT SERPL-MCNC: 6.6 G/DL (ref 6–8.4)
RBC # BLD AUTO: 5.19 M/UL (ref 4.6–6.2)
SODIUM SERPL-SCNC: 143 MMOL/L (ref 136–145)
T4 FREE SERPL-MCNC: 1.26 NG/DL (ref 0.71–1.51)
TRIGL SERPL-MCNC: 68 MG/DL (ref 30–150)
TSH SERPL DL<=0.005 MIU/L-ACNC: 0.34 UIU/ML (ref 0.4–4)
WBC # BLD AUTO: 4.48 K/UL (ref 3.9–12.7)

## 2022-08-16 RX ORDER — LEVOTHYROXINE SODIUM 150 UG/1
150 TABLET ORAL DAILY
Qty: 90 TABLET | Refills: 3 | Status: SHIPPED | OUTPATIENT
Start: 2022-08-16 | End: 2023-10-11

## 2022-08-16 NOTE — TELEPHONE ENCOUNTER
Thyroid is slightly over replaced.  We will decrease the dose of the synthroid and repeat the labs in 3 months.

## 2022-08-16 NOTE — TELEPHONE ENCOUNTER
Informed pt of lab results and medication change.  Pt will  new prescription and repeat labs in 3 months.

## 2022-09-22 RX ORDER — SILDENAFIL 50 MG/1
TABLET, FILM COATED ORAL
Qty: 10 TABLET | Refills: 11 | Status: SHIPPED | OUTPATIENT
Start: 2022-09-22 | End: 2023-08-03

## 2022-09-22 NOTE — TELEPHONE ENCOUNTER
No new care gaps identified.  [facility] Embedded Care Gaps. Reference number: [id number]. 9/22/2022   12:50:07 PM CDT

## 2022-09-22 NOTE — TELEPHONE ENCOUNTER
Refill Decision Note   Jeff Cameron  is requesting a refill authorization.  Brief Assessment and Rationale for Refill:  Approve     Medication Therapy Plan:       Medication Reconciliation Completed: No   Comments:     No Care Gaps recommended.     Note composed:2:28 PM 09/22/2022

## 2022-11-21 ENCOUNTER — TELEPHONE (OUTPATIENT)
Dept: INTERNAL MEDICINE | Facility: CLINIC | Age: 52
End: 2022-11-21
Payer: MEDICAID

## 2022-11-21 NOTE — TELEPHONE ENCOUNTER
Notified pt that he doesn't need to be seen. We can write excuse to return to work. Pt requested to have work excuse emailed. Explained to pt that we can not email. I can fax it to employer or he can pick it up. Pt became upset and said, keep it. If he needs it, he will come pick it up. Letter left at registration desk

## 2022-11-21 NOTE — TELEPHONE ENCOUNTER
----- Message from Freida Feliz sent at 11/21/2022  4:01 PM CST -----  Contact: PT  .Type:  Needs Medical Advice    Who Called:  Olu  Symptoms (please be specific):     How long has patient had these symptoms:    Pharmacy name and phone #:    Would the patient rather a call back or a response via [facility]?   Callback   Best Call Back Number:  [phone] (home)     Additional Information:   positive at home Covid test/ need to know return to work protocol- requesting a callback today

## 2022-11-21 NOTE — TELEPHONE ENCOUNTER
MD Fifi Mcintyre, LPN  Caller: Unspecified (Today,  4:42 PM)  The guidelines state he must be at least 5 days since symptom onset, have experienced a significant improvement in symptoms, afebrile without the use of medications to suppress fever.  If he has met those requirements he is able to return to work but must wear a KN95 mask for 5 more days when around other people.       If those conditions have been met, he does not need to be seen

## 2022-11-21 NOTE — LETTER
November 21, 2022    Jeff Cameron  [address] [place] [address]             [facility]  Internal Medicine  [address] [place] [address]  Phone: [phone]  Fax: [phone]   November 21, 2022     Patient: Jeff Cameron   YOB: 1970   Date of Visit: 11/21/2022       To Whom it May Concern:    Jeff Cameron tested positive for Covid on Tuesday, 11/15/2022 with home Covid test. He may return to work on 11/22/2022 masked until 11/25/2022 .    Please excuse him from any  work missed.    If you have any questions or concerns, please don't hesitate to call.    Sincerely,         Wilber Ortiz MD/DAINA Hanks

## 2022-11-21 NOTE — TELEPHONE ENCOUNTER
Pt tested positive for Covid on Tuesday, 11/15 with home test. He's wanting to return to work asap. He needs a work excuse to return. No fever. He does have a lingering cough. Will he need an appointment to receive an excuse?

## 2023-08-03 ENCOUNTER — OFFICE VISIT (OUTPATIENT)
Dept: INTERNAL MEDICINE | Facility: CLINIC | Age: 53
End: 2023-08-03
Payer: COMMERCIAL

## 2023-08-03 VITALS
DIASTOLIC BLOOD PRESSURE: 80 MMHG | HEIGHT: 77 IN | WEIGHT: 295.44 LBS | BODY MASS INDEX: 34.88 KG/M2 | HEART RATE: 85 BPM | TEMPERATURE: 98 F | SYSTOLIC BLOOD PRESSURE: 120 MMHG

## 2023-08-03 DIAGNOSIS — E03.8 HYPOTHYROIDISM DUE TO HASHIMOTO'S THYROIDITIS: ICD-10-CM

## 2023-08-03 DIAGNOSIS — Z12.11 COLON CANCER SCREENING: ICD-10-CM

## 2023-08-03 DIAGNOSIS — E06.3 HYPOTHYROIDISM DUE TO HASHIMOTO'S THYROIDITIS: ICD-10-CM

## 2023-08-03 DIAGNOSIS — F41.9 ANXIETY: ICD-10-CM

## 2023-08-03 DIAGNOSIS — Z00.00 ROUTINE GENERAL MEDICAL EXAMINATION AT HEALTH CARE FACILITY: Primary | ICD-10-CM

## 2023-08-03 PROCEDURE — 3008F BODY MASS INDEX DOCD: CPT | Mod: CPTII,S$GLB,, | Performed by: INTERNAL MEDICINE

## 2023-08-03 PROCEDURE — 1160F RVW MEDS BY RX/DR IN RCRD: CPT | Mod: CPTII,S$GLB,, | Performed by: INTERNAL MEDICINE

## 2023-08-03 PROCEDURE — 3008F PR BODY MASS INDEX (BMI) DOCUMENTED: ICD-10-PCS | Mod: CPTII,S$GLB,, | Performed by: INTERNAL MEDICINE

## 2023-08-03 PROCEDURE — 99396 PREV VISIT EST AGE 40-64: CPT | Mod: S$GLB,,, | Performed by: INTERNAL MEDICINE

## 2023-08-03 PROCEDURE — 1159F MED LIST DOCD IN RCRD: CPT | Mod: CPTII,S$GLB,, | Performed by: INTERNAL MEDICINE

## 2023-08-03 PROCEDURE — 3079F PR MOST RECENT DIASTOLIC BLOOD PRESSURE 80-89 MM HG: ICD-10-PCS | Mod: CPTII,S$GLB,, | Performed by: INTERNAL MEDICINE

## 2023-08-03 PROCEDURE — 99999 PR PBB SHADOW E&M-EST. PATIENT-LVL IV: CPT | Mod: PBBFAC,,, | Performed by: INTERNAL MEDICINE

## 2023-08-03 PROCEDURE — 3074F SYST BP LT 130 MM HG: CPT | Mod: CPTII,S$GLB,, | Performed by: INTERNAL MEDICINE

## 2023-08-03 PROCEDURE — 99999 PR PBB SHADOW E&M-EST. PATIENT-LVL IV: ICD-10-PCS | Mod: PBBFAC,,, | Performed by: INTERNAL MEDICINE

## 2023-08-03 PROCEDURE — 3074F PR MOST RECENT SYSTOLIC BLOOD PRESSURE < 130 MM HG: ICD-10-PCS | Mod: CPTII,S$GLB,, | Performed by: INTERNAL MEDICINE

## 2023-08-03 PROCEDURE — 1159F PR MEDICATION LIST DOCUMENTED IN MEDICAL RECORD: ICD-10-PCS | Mod: CPTII,S$GLB,, | Performed by: INTERNAL MEDICINE

## 2023-08-03 PROCEDURE — 3079F DIAST BP 80-89 MM HG: CPT | Mod: CPTII,S$GLB,, | Performed by: INTERNAL MEDICINE

## 2023-08-03 PROCEDURE — 99396 PR PREVENTIVE VISIT,EST,40-64: ICD-10-PCS | Mod: S$GLB,,, | Performed by: INTERNAL MEDICINE

## 2023-08-03 PROCEDURE — 1160F PR REVIEW ALL MEDS BY PRESCRIBER/CLIN PHARMACIST DOCUMENTED: ICD-10-PCS | Mod: CPTII,S$GLB,, | Performed by: INTERNAL MEDICINE

## 2023-08-03 RX ORDER — MINOCYCLINE HYDROCHLORIDE 100 MG/1
100 CAPSULE ORAL
COMMUNITY
Start: 2023-05-16

## 2023-08-03 RX ORDER — QUETIAPINE 300 MG/1
300 TABLET, FILM COATED, EXTENDED RELEASE ORAL NIGHTLY
COMMUNITY
Start: 2023-08-03

## 2023-08-03 RX ORDER — QUETIAPINE FUMARATE 300 MG/1
150 TABLET, FILM COATED ORAL NIGHTLY
COMMUNITY
Start: 2023-07-27

## 2023-08-03 RX ORDER — LORAZEPAM 2 MG/1
TABLET ORAL
COMMUNITY
Start: 2023-07-27

## 2023-08-03 NOTE — PROGRESS NOTES
"Subjective:       Patient ID: Jeff Camreon is a 52 y.o. male.    Chief Complaint: Follow-up      HPI:  Patient is a 52-year-old male presenting today for updated physical exam review of chronic health issues.  Patient has history of hypothyroidism, anxiety.  He indicates he has been doing well he continues to follow with his prescribing psychologist and is on a stable regimen of medications.  He is not noted any significant issues with his meds and things seem to be doing quite well at this point.  He is due for updated lab work.  He requests testosterone levels to be drawn as he is middle-aged and he does not feel like he has much energy as he should.  Patient is due for colon cancer screening.    Review of Systems   Constitutional:  Negative for fever and unexpected weight change.   HENT:  Negative for hearing loss, postnasal drip and rhinorrhea.    Eyes:  Negative for pain and visual disturbance.   Respiratory:  Negative for cough, shortness of breath and wheezing.    Cardiovascular:  Negative for chest pain and palpitations.   Gastrointestinal:  Negative for constipation, diarrhea, nausea and vomiting.   Genitourinary:  Negative for dysuria and hematuria.   Musculoskeletal:  Negative for arthralgias, back pain, myalgias and neck stiffness.   Skin:  Negative for pallor and rash.   Neurological:  Negative for seizures, syncope and headaches.   Hematological:  Negative for adenopathy.   Psychiatric/Behavioral:  Negative for dysphoric mood. The patient is not nervous/anxious.        Objective:   /80   Pulse 85   Temp 97.8 °F (36.6 °C) (Tympanic)   Ht 6' 5" (1.956 m)   Wt 134 kg (295 lb 6.7 oz)   BMI 35.03 kg/m²      Physical Exam  Vitals reviewed.   Constitutional:       General: He is not in acute distress.     Appearance: He is well-developed.   HENT:      Head: Normocephalic and atraumatic.      Right Ear: Tympanic membrane and ear canal normal.      Left Ear: Tympanic membrane and ear canal " normal.   Eyes:      Pupils: Pupils are equal, round, and reactive to light.   Neck:      Thyroid: No thyromegaly.      Vascular: No JVD.   Cardiovascular:      Rate and Rhythm: Normal rate and regular rhythm.      Heart sounds: Normal heart sounds. No murmur heard.     No friction rub. No gallop.   Pulmonary:      Effort: Pulmonary effort is normal.      Breath sounds: Normal breath sounds. No wheezing or rales.   Abdominal:      General: Bowel sounds are normal. There is no distension.      Palpations: Abdomen is soft.      Tenderness: There is no abdominal tenderness. There is no guarding or rebound.   Musculoskeletal:         General: Normal range of motion.      Cervical back: Normal range of motion and neck supple.   Lymphadenopathy:      Cervical: No cervical adenopathy.   Skin:     General: Skin is warm and dry.      Findings: No rash.   Neurological:      General: No focal deficit present.      Mental Status: He is alert and oriented to person, place, and time.      Cranial Nerves: No cranial nerve deficit.      Deep Tendon Reflexes: Reflexes are normal and symmetric.   Psychiatric:         Mood and Affect: Mood normal.         Judgment: Judgment normal.             Assessment:       1. Routine general medical examination at [facility]    2. Hypothyroidism due to Hashimoto's thyroiditis    3. Anxiety    4. Colon cancer screening        Plan:   No problem-specific Assessment & Plan notes found for this encounter.    Routine general medical examination at [facility]  -     CBC Auto Differential; Future; Expected date: 08/05/2023  -     Comprehensive Metabolic Panel; Future; Expected date: 08/05/2023  -     Lipid Panel; Future; Expected date: 08/05/2023  -     PSA, Screening; Future; Expected date: 08/05/2023  -     TSH; Future; Expected date: 08/05/2023  -     Hemoglobin A1C; Future; Expected date: 08/05/2023  -     TESTOSTERONE PANEL; Future; Expected date: 08/05/2023    Hypothyroidism  due to Hashimoto's thyroiditis  Comments:  Continue on synthroid, stable.    Anxiety    Colon cancer screening  -     Ambulatory referral/consult to Endo Procedure ; Future; Expected date: 08/04/2023          Follow up in about 1 year (around 8/3/2024).

## 2023-08-05 ENCOUNTER — LAB VISIT (OUTPATIENT)
Dept: LAB | Facility: HOSPITAL | Age: 53
End: 2023-08-05
Attending: INTERNAL MEDICINE
Payer: COMMERCIAL

## 2023-08-05 DIAGNOSIS — Z00.00 ROUTINE GENERAL MEDICAL EXAMINATION AT HEALTH CARE FACILITY: ICD-10-CM

## 2023-08-05 LAB
ALBUMIN SERPL BCP-MCNC: 4 G/DL (ref 3.5–5.2)
ALP SERPL-CCNC: 58 U/L (ref 55–135)
ALT SERPL W/O P-5'-P-CCNC: 29 U/L (ref 10–44)
ANION GAP SERPL CALC-SCNC: 12 MMOL/L (ref 8–16)
AST SERPL-CCNC: 23 U/L (ref 10–40)
BASOPHILS # BLD AUTO: 0.04 K/UL (ref 0–0.2)
BASOPHILS NFR BLD: 0.8 % (ref 0–1.9)
BILIRUB SERPL-MCNC: 0.3 MG/DL (ref 0.1–1)
BUN SERPL-MCNC: 16 MG/DL (ref 6–20)
CALCIUM SERPL-MCNC: 9.6 MG/DL (ref 8.7–10.5)
CHLORIDE SERPL-SCNC: 104 MMOL/L (ref 95–110)
CHOLEST SERPL-MCNC: 202 MG/DL (ref 120–199)
CHOLEST/HDLC SERPL: 4.9 {RATIO} (ref 2–5)
CO2 SERPL-SCNC: 25 MMOL/L (ref 23–29)
COMPLEXED PSA SERPL-MCNC: 0.85 NG/ML (ref 0–4)
CREAT SERPL-MCNC: 1.5 MG/DL (ref 0.5–1.4)
DIFFERENTIAL METHOD: ABNORMAL
EOSINOPHIL # BLD AUTO: 0.2 K/UL (ref 0–0.5)
EOSINOPHIL NFR BLD: 3.8 % (ref 0–8)
ERYTHROCYTE [DISTWIDTH] IN BLOOD BY AUTOMATED COUNT: 13.3 % (ref 11.5–14.5)
EST. GFR  (NO RACE VARIABLE): 55 ML/MIN/1.73 M^2
ESTIMATED AVG GLUCOSE: 117 MG/DL (ref 68–131)
GLUCOSE SERPL-MCNC: 90 MG/DL (ref 70–110)
HBA1C MFR BLD: 5.7 % (ref 4–5.6)
HCT VFR BLD AUTO: 44.2 % (ref 40–54)
HDLC SERPL-MCNC: 41 MG/DL (ref 40–75)
HDLC SERPL: 20.3 % (ref 20–50)
HGB BLD-MCNC: 14 G/DL (ref 14–18)
IMM GRANULOCYTES # BLD AUTO: 0.01 K/UL (ref 0–0.04)
IMM GRANULOCYTES NFR BLD AUTO: 0.2 % (ref 0–0.5)
LDLC SERPL CALC-MCNC: 143.8 MG/DL (ref 63–159)
LYMPHOCYTES # BLD AUTO: 1.9 K/UL (ref 1–4.8)
LYMPHOCYTES NFR BLD: 36.8 % (ref 18–48)
MCH RBC QN AUTO: 27.6 PG (ref 27–31)
MCHC RBC AUTO-ENTMCNC: 31.7 G/DL (ref 32–36)
MCV RBC AUTO: 87 FL (ref 82–98)
MONOCYTES # BLD AUTO: 0.6 K/UL (ref 0.3–1)
MONOCYTES NFR BLD: 11.2 % (ref 4–15)
NEUTROPHILS # BLD AUTO: 2.5 K/UL (ref 1.8–7.7)
NEUTROPHILS NFR BLD: 47.2 % (ref 38–73)
NONHDLC SERPL-MCNC: 161 MG/DL
NRBC BLD-RTO: 0 /100 WBC
PLATELET # BLD AUTO: 169 K/UL (ref 150–450)
PMV BLD AUTO: 10.4 FL (ref 9.2–12.9)
POTASSIUM SERPL-SCNC: 4.4 MMOL/L (ref 3.5–5.1)
PROT SERPL-MCNC: 7.2 G/DL (ref 6–8.4)
RBC # BLD AUTO: 5.08 M/UL (ref 4.6–6.2)
SODIUM SERPL-SCNC: 141 MMOL/L (ref 136–145)
T4 FREE SERPL-MCNC: 1.08 NG/DL (ref 0.71–1.51)
TRIGL SERPL-MCNC: 86 MG/DL (ref 30–150)
TSH SERPL DL<=0.005 MIU/L-ACNC: 5.52 UIU/ML (ref 0.4–4)
WBC # BLD AUTO: 5.25 K/UL (ref 3.9–12.7)

## 2023-08-05 PROCEDURE — 80053 COMPREHEN METABOLIC PANEL: CPT | Performed by: INTERNAL MEDICINE

## 2023-08-05 PROCEDURE — 80061 LIPID PANEL: CPT | Performed by: INTERNAL MEDICINE

## 2023-08-05 PROCEDURE — 84439 ASSAY OF FREE THYROXINE: CPT | Performed by: INTERNAL MEDICINE

## 2023-08-05 PROCEDURE — 84443 ASSAY THYROID STIM HORMONE: CPT | Performed by: INTERNAL MEDICINE

## 2023-08-05 PROCEDURE — 84153 ASSAY OF PSA TOTAL: CPT | Performed by: INTERNAL MEDICINE

## 2023-08-05 PROCEDURE — 84403 ASSAY OF TOTAL TESTOSTERONE: CPT | Performed by: INTERNAL MEDICINE

## 2023-08-05 PROCEDURE — 85025 COMPLETE CBC W/AUTO DIFF WBC: CPT | Performed by: INTERNAL MEDICINE

## 2023-08-05 PROCEDURE — 84270 ASSAY OF SEX HORMONE GLOBUL: CPT | Performed by: INTERNAL MEDICINE

## 2023-08-05 PROCEDURE — 83036 HEMOGLOBIN GLYCOSYLATED A1C: CPT | Performed by: INTERNAL MEDICINE

## 2023-08-06 DIAGNOSIS — N28.9 RENAL INSUFFICIENCY: Primary | ICD-10-CM

## 2023-08-06 DIAGNOSIS — E03.9 HYPOTHYROIDISM, UNSPECIFIED TYPE: ICD-10-CM

## 2023-08-07 ENCOUNTER — HOSPITAL ENCOUNTER (OUTPATIENT)
Dept: PREADMISSION TESTING | Facility: HOSPITAL | Age: 53
Discharge: HOME | End: 2023-08-07
Attending: INTERNAL MEDICINE
Payer: COMMERCIAL

## 2023-08-07 DIAGNOSIS — Z12.11 COLON CANCER SCREENING: Primary | ICD-10-CM

## 2023-08-07 RX ORDER — SODIUM, POTASSIUM,MAG SULFATES 17.5-3.13G
1 SOLUTION, RECONSTITUTED, ORAL ORAL DAILY
Qty: 1 KIT | Refills: 0 | Status: SHIPPED | OUTPATIENT
Start: 2023-08-07 | End: 2023-08-09

## 2023-08-13 LAB
ALBUMIN SERPL-MCNC: 4.3 G/DL (ref 3.6–5.1)
SHBG SERPL-SCNC: 14 NMOL/L (ref 10–50)
TESTOST FREE SERPL-MCNC: 59.7 PG/ML (ref 46–224)
TESTOST SERPL-MCNC: 256 NG/DL (ref 250–1100)
TESTOSTERONE.FREE+WB SERPL-MCNC: 117.5 NG/DL (ref 110–575)

## 2023-08-14 ENCOUNTER — TELEPHONE (OUTPATIENT)
Dept: INTERNAL MEDICINE | Facility: CLINIC | Age: 53
End: 2023-08-14
Payer: COMMERCIAL

## 2023-08-14 ENCOUNTER — PATIENT MESSAGE (OUTPATIENT)
Dept: INTERNAL MEDICINE | Facility: CLINIC | Age: 53
End: 2023-08-14
Payer: COMMERCIAL

## 2023-08-14 DIAGNOSIS — R53.83 FATIGUE, UNSPECIFIED TYPE: Primary | ICD-10-CM

## 2023-08-14 NOTE — TELEPHONE ENCOUNTER
Spoke with pt, pt is unhappy with Testosterone levels, notified pt labs were in normal range, wants more information on how  to boost testosterone level or wants to know if he can be prescribed something.

## 2023-08-14 NOTE — TELEPHONE ENCOUNTER
I do not treat normal testosterone levels.  I will have him see sleep medicine as I offered but there is no reason to give him testosterone at this time.

## 2023-08-14 NOTE — TELEPHONE ENCOUNTER
----- Message from Hilda Doran sent at 8/14/2023 12:18 PM CDT -----  Contact: pt  Type: Needs Medical Advice  Who Called:  pt     Best Call Back Number: [phone]    Additional Information: pt would like to speak with nursing staff regarding lab results. Please advise.

## 2023-09-05 ENCOUNTER — ANESTHESIA EVENT (OUTPATIENT)
Dept: ENDOSCOPY | Facility: HOSPITAL | Age: 53
End: 2023-09-05
Payer: COMMERCIAL

## 2023-09-05 NOTE — ANESTHESIA PREPROCEDURE EVALUATION
09/05/2023  Jeff Cameron is a 52 y.o., male.  Past Medical History:   Diagnosis Date    Anxiety     Glycosuria     Hypothyroidism      Past Surgical History:   Procedure Laterality Date    HERNIA REPAIR      right shoulder surgery      x2         Pre-op Assessment    I have reviewed the Patient Summary Reports.     I have reviewed the Nursing Notes. I have reviewed the NPO Status.   I have reviewed the Medications.     Review of Systems  Anesthesia Hx:  No problems with previous Anesthesia  History of prior surgery of interest to airway management or planning: Previous anesthesia: General Denies Family Hx of Anesthesia complications.   Denies Personal Hx of Anesthesia complications.   Social:  Non-Smoker, Social Alcohol Use    Hepatic/GI:   Bowel Prep.    Endocrine:   Hypothyroidism    Psych:   anxiety          Physical Exam  General: Alert and Oriented    Airway:  Mallampati: II   Mouth Opening: Normal  TM Distance: Normal  Tongue: Normal  Neck ROM: Normal ROM  Neck: Girth Increased    Chest/Lungs:  Clear to auscultation, Normal Respiratory Rate    Heart:  Rate: Normal  Rhythm: Regular Rhythm        Anesthesia Plan  Type of Anesthesia, risks & benefits discussed:    Anesthesia Type: Gen Natural Airway  Intra-op Monitoring Plan: Standard ASA Monitors  Induction:  IV  Informed Consent: Informed consent signed with the Patient and all parties understand the risks and agree with anesthesia plan.  All questions answered. Patient consented to blood products? No  ASA Score: 2  Day of Surgery Review of History & Physical: H&P Update referred to the surgeon/provider.    Ready For Surgery From Anesthesia Perspective.     .

## 2023-09-08 ENCOUNTER — ANESTHESIA (OUTPATIENT)
Dept: ENDOSCOPY | Facility: HOSPITAL | Age: 53
End: 2023-09-08
Payer: COMMERCIAL

## 2023-09-08 ENCOUNTER — HOSPITAL ENCOUNTER (OUTPATIENT)
Facility: HOSPITAL | Age: 53
Discharge: HOME | End: 2023-09-08
Attending: INTERNAL MEDICINE | Admitting: INTERNAL MEDICINE
Payer: COMMERCIAL

## 2023-09-08 VITALS
HEART RATE: 83 BPM | RESPIRATION RATE: 27 BRPM | BODY MASS INDEX: 33.89 KG/M2 | HEIGHT: 77 IN | TEMPERATURE: 97 F | SYSTOLIC BLOOD PRESSURE: 146 MMHG | WEIGHT: 287.06 LBS | OXYGEN SATURATION: 98 % | DIASTOLIC BLOOD PRESSURE: 90 MMHG

## 2023-09-08 DIAGNOSIS — Z12.11 COLON CANCER SCREENING: Primary | ICD-10-CM

## 2023-09-08 PROCEDURE — 37000009 HC ANESTHESIA EA ADD 15 MINS: Performed by: INTERNAL MEDICINE

## 2023-09-08 PROCEDURE — G0121 COLON CA SCRN NOT HI RSK IND: ICD-10-PCS | Mod: ,,, | Performed by: INTERNAL MEDICINE

## 2023-09-08 PROCEDURE — D9220A PRA ANESTHESIA: Mod: ,,, | Performed by: NURSE ANESTHETIST, CERTIFIED REGISTERED

## 2023-09-08 PROCEDURE — G0121 COLON CA SCRN NOT HI RSK IND: HCPCS | Mod: ,,, | Performed by: INTERNAL MEDICINE

## 2023-09-08 PROCEDURE — G0121 COLON CA SCRN NOT HI RSK IND: HCPCS | Performed by: INTERNAL MEDICINE

## 2023-09-08 PROCEDURE — 37000008 HC ANESTHESIA 1ST 15 MINUTES: Performed by: INTERNAL MEDICINE

## 2023-09-08 PROCEDURE — 25000003 PHARM REV CODE 250: Performed by: NURSE ANESTHETIST, CERTIFIED REGISTERED

## 2023-09-08 PROCEDURE — 63600175 PHARM REV CODE 636 W HCPCS: Performed by: INTERNAL MEDICINE

## 2023-09-08 PROCEDURE — 63600175 PHARM REV CODE 636 W HCPCS: Performed by: NURSE ANESTHETIST, CERTIFIED REGISTERED

## 2023-09-08 PROCEDURE — D9220A PRA ANESTHESIA: ICD-10-PCS | Mod: ,,, | Performed by: NURSE ANESTHETIST, CERTIFIED REGISTERED

## 2023-09-08 RX ORDER — LIDOCAINE HYDROCHLORIDE 20 MG/ML
INJECTION, SOLUTION EPIDURAL; INFILTRATION; INTRACAUDAL; PERINEURAL
Status: DISCONTINUED | OUTPATIENT
Start: 2023-09-08 | End: 2023-09-08

## 2023-09-08 RX ORDER — SODIUM CHLORIDE 9 MG/ML
INJECTION, SOLUTION INTRAVENOUS CONTINUOUS
Status: ACTIVE | OUTPATIENT
Start: 2023-09-08

## 2023-09-08 RX ORDER — PROPOFOL 10 MG/ML
VIAL (ML) INTRAVENOUS
Status: DISCONTINUED | OUTPATIENT
Start: 2023-09-08 | End: 2023-09-08

## 2023-09-08 RX ORDER — SODIUM CHLORIDE, SODIUM LACTATE, POTASSIUM CHLORIDE, CALCIUM CHLORIDE 600; 310; 30; 20 MG/100ML; MG/100ML; MG/100ML; MG/100ML
INJECTION, SOLUTION INTRAVENOUS CONTINUOUS
Status: ACTIVE | OUTPATIENT
Start: 2023-09-08

## 2023-09-08 RX ADMIN — PROPOFOL 20 MG: 10 INJECTION, EMULSION INTRAVENOUS at 12:09

## 2023-09-08 RX ADMIN — PROPOFOL 50 MG: 10 INJECTION, EMULSION INTRAVENOUS at 12:09

## 2023-09-08 RX ADMIN — SODIUM CHLORIDE, POTASSIUM CHLORIDE, SODIUM LACTATE AND CALCIUM CHLORIDE: 600; 310; 30; 20 INJECTION, SOLUTION INTRAVENOUS at 12:09

## 2023-09-08 RX ADMIN — LIDOCAINE HYDROCHLORIDE 60 MG: 20 INJECTION, SOLUTION EPIDURAL; INFILTRATION; INTRACAUDAL; PERINEURAL at 12:09

## 2023-09-08 NOTE — H&P
PRE PROCEDURE H&P    Patient Name: Jeff Cameron  MRN: [id number]  : 1970  Date of Procedure:  2023  Referring Physician: Wilber Ortiz MD  Primary Physician: Wilber Ortiz MD  Procedure Physician: Trevor Tillman MD       Planned Procedure: Colonoscopy  Diagnosis: screening for colon cancer  Chief Complaint: Same as above    HPI: Patient is an 52 y.o. male is here for the above.     Last colonoscopy: Index colonoscopy   Family history: None   Anticoagulation: None     Past Medical History:   Past Medical History:   Diagnosis Date    Anxiety     Glycosuria     Hypothyroidism         Past Surgical History:  Past Surgical History:   Procedure Laterality Date    HERNIA REPAIR      right shoulder surgery      x2        Home Medications:  Prior to Admission medications    Medication Sig Start Date End Date Taking? Authorizing Provider   levothyroxine (SYNTHROID) 150 MCG tablet Take 1 tablet (150 mcg total) by mouth once daily. 22  Yes Wibler Ortiz MD   LORazepam (ATIVAN) 2 MG Tab SMARTSI Tablet(s) By Mouth Morning-Night 23  Yes Provider, Historical   minocycline (MINOCIN,DYNACIN) 100 MG capsule Take 100 mg by mouth. 23  Yes Provider, Historical   QUEtiapine (SEROQUEL XR) 300 MG Tb24 Take 1 tablet (300 mg total) by mouth every evening. 8/3/23  Yes Wilber Ortiz MD   QUEtiapine (SEROQUEL) 300 MG Tab Take 150 mg by mouth every evening. 23  Yes Provider, Historical        Allergies:  Review of patient's allergies indicates:  No Known Allergies     Social History:   Social History     Socioeconomic History    Marital status: Single   Occupational History     Employer: [employer]   Tobacco Use    Smoking status: Former     Current packs/day: 0.00     Types: Cigarettes     Quit date: 3/4/2015     Years since quittin.5    Smokeless tobacco: Never   Substance and Sexual Activity    Alcohol use: Yes     Alcohol/week: 0.0 standard drinks of alcohol     Comment:  "drinks vodka irregularly     Drug use: No    Sexual activity: Yes       Family History:  Family History   Problem Relation Age of Onset    Kidney disease Other         grandfather    Colon cancer Father 74       ROS: No acute cardiac events, no acute respiratory complaints.     Physical Exam (all patients):    BP (!) 139/100 (BP Location: Right arm, Patient Position: Sitting)   Pulse 85   Temp 97.9 °F (36.6 °C) (Temporal)   Resp 20   Ht 6' 5" (1.956 m)   Wt 130.2 kg (287 lb 0.6 oz)   SpO2 97%   BMI 34.04 kg/m²   Lungs: Clear to auscultation bilaterally, respirations unlabored  Heart: Regular rate and rhythm, S1 and S2 normal, no obvious murmurs  Abdomen:         Soft, non-tender, bowel sounds normal, no masses, no organomegaly    Lab Results   Component Value Date    WBC 5.25 08/05/2023    MCV 87 08/05/2023    RDW 13.3 08/05/2023     08/05/2023    GLU 90 08/05/2023    HGBA1C 5.7 (H) 08/05/2023    BUN 16 08/05/2023     08/05/2023    K 4.4 08/05/2023     08/05/2023        SEDATION PLAN: per anesthesia      History reviewed, vital signs satisfactory, cardiopulmonary status satisfactory, sedation options, risks and plans have been discussed with the patient  All their questions were answered and the patient agrees to the sedation procedures as planned and the patient is deemed an appropriate candidate for the sedation as planned.    Procedure explained to patient, informed consent obtained and placed in chart.    Trevor Tillman  9/8/2023  12:26 PM     "

## 2023-09-08 NOTE — PROVATION PATIENT INSTRUCTIONS
Discharge Summary/Instructions after an Endoscopic Procedure  Patient Name: Jeff Cameron  Patient MRN: [id number]  Patient YOB: 1970  Friday, September 8, 2023  Trevor Tillman MD  Dear patient,  As a result of recent federal legislation (The Federal Cures Act), you may   receive lab or pathology results from your procedure in your [facility]   account before your physician is able to contact you. Your physician or   their representative will relay the results to you with their   recommendations at their soonest availability.  Thank you,  RESTRICTIONS:  During your procedure today, you received medications for sedation.  These   medications may affect your judgment, balance and coordination.  Therefore,   for 24 hours, you have the following restrictions:   - DO NOT drive a car, operate machinery, make legal/financial decisions,   sign important papers or drink alcohol.    ACTIVITY:  Today: no heavy lifting, straining or running due to procedural   sedation/anesthesia.  The following day: return to full activity including work.  DIET:  Eat and drink normally unless instructed otherwise.     TREATMENT FOR COMMON SIDE EFFECTS:  - Mild abdominal pain, nausea, belching, bloating or excessive gas:  rest,   eat lightly and use a heating pad.  - Sore Throat: treat with throat lozenges and/or gargle with warm salt   water.  - Because air was used during the procedure, expelling large amounts of air   from your rectum or belching is normal.  - If a bowel prep was taken, you may not have a bowel movement for 1-3 days.    This is normal.  SYMPTOMS TO WATCH FOR AND REPORT TO YOUR PHYSICIAN:  1. Abdominal pain or bloating, other than gas cramps.  2. Chest pain.  3. Back pain.  4. Signs of infection such as: chills or fever occurring within 24 hours   after the procedure.  5. Rectal bleeding, which would show as bright red, maroon, or black stools.   (A tablespoon of blood from the rectum is not serious,  especially if   hemorrhoids are present.)  6. Vomiting.  7. Weakness or dizziness.  GO DIRECTLY TO THE NEAREST EMERGENCY ROOM IF YOU HAVE ANY OF THE FOLLOWING:      Difficulty breathing              Chills and/or fever over 101 F   Persistent vomiting and/or vomiting blood   Severe abdominal pain   Severe chest pain   Black, tarry stools   Bleeding- more than one tablespoon   Any other symptom or condition that you feel may need urgent attention  Your doctor recommends these additional instructions:  If any biopsies were taken, your doctors clinic will contact you in 1 to 2   weeks with any results.  - Discharge patient to home.   - Resume previous diet.   - Continue present medications.   - Repeat colonoscopy in 1 year because the bowel preparation was suboptimal.     - Return to referring physician.   - Patient has a contact number available for emergencies.  The signs and   symptoms of potential delayed complications were discussed with the   patient.  Return to normal activities tomorrow.  Written discharge   instructions were provided to the patient.  For questions, problems or results please call your physician Trevor Tillman MD at Work:  [phone]  If you have any questions about the above instructions, call the GI   department at [phone] or call the endoscopy unit at [phone]   from 7am until 3 pm.  [facility], EMERGENCY ROOM PHONE NUMBER:   [phone]  IF A COMPLICATION OR EMERGENCY SITUATION ARISES AND YOU ARE UNABLE TO REACH   YOUR PHYSICIAN - GO DIRECTLY TO THE EMERGENCY ROOM.  I have read or have had read to me these discharge instructions for my   procedure and have received a written copy.  I understand these   instructions and will follow-up with my physician if I have any questions.     __________________________________       _____________________________________  Nurse Signature                                          Patient/Designated    Responsible Party Signature  Trevor Tillman MD  9/8/2023 12:49:03 PM  This report has been verified and signed electronically.  Dear patient,  As a result of recent federal legislation (The Federal Cures Act), you may   receive lab or pathology results from your procedure in your [facility]   account before your physician is able to contact you. Your physician or   their representative will relay the results to you with their   recommendations at their soonest availability.  Thank you,  PROVATION

## 2023-09-08 NOTE — TRANSFER OF CARE
"Anesthesia Transfer of Care Note    Patient: Jeff Cameron    Procedure(s) Performed: Procedure(s) (LRB):  COLONOSCOPY (N/A)    Patient location: PACU    Anesthesia Type: general    Transport from OR: Transported from OR on room air with adequate spontaneous ventilation    Post pain: adequate analgesia    Post assessment: no apparent anesthetic complications and tolerated procedure well    Post vital signs: stable    Level of consciousness: awake and alert    Nausea/Vomiting: no nausea/vomiting    Complications: none    Transfer of care protocol was followed      Last vitals:   Visit Vitals  BP (!) 139/100 (BP Location: Right arm, Patient Position: Sitting)   Pulse 85   Temp 36.6 °C (97.9 °F) (Temporal)   Resp 20   Ht 6' 5" (1.956 m)   Wt 130.2 kg (287 lb 0.6 oz)   SpO2 97%   BMI 34.04 kg/m²     "

## 2023-09-08 NOTE — ANESTHESIA POSTPROCEDURE EVALUATION
Anesthesia Post Evaluation    Patient: Jeff Cameron    Procedure(s) Performed: Procedure(s) (LRB):  COLONOSCOPY (N/A)    Final Anesthesia Type: general      Patient location during evaluation: PACU  Patient participation: Yes- Able to Participate  Level of consciousness: awake and alert and oriented  Post-procedure vital signs: reviewed and stable  Pain management: adequate  Airway patency: patent    PONV status at discharge: No PONV  Anesthetic complications: no      Cardiovascular status: blood pressure returned to baseline, stable and hemodynamically stable  Respiratory status: unassisted  Hydration status: euvolemic  Follow-up not needed.          Vitals Value Taken Time   /96 09/08/23 1304   Temp 36.3 °C (97.3 °F) 09/08/23 1250   Pulse 81 09/08/23 1307   Resp 17 09/08/23 1307   SpO2 96 % 09/08/23 1307   Vitals shown include unvalidated device data.      No case tracking events are documented in the log.      Pain/Del Score: Del Score: 10 (9/8/2023  1:00 PM)

## 2023-09-27 DIAGNOSIS — E03.9 HYPOTHYROIDISM, UNSPECIFIED TYPE: ICD-10-CM

## 2023-09-27 RX ORDER — SILDENAFIL 50 MG/1
TABLET, FILM COATED ORAL
Qty: 10 TABLET | Refills: 11 | Status: SHIPPED | OUTPATIENT
Start: 2023-09-27

## 2023-09-27 NOTE — TELEPHONE ENCOUNTER
No care due was identified.  [facility] Embedded Care Due Messages. Reference number: [id number].   9/27/2023 4:04:13 PM CDT

## 2023-09-27 NOTE — TELEPHONE ENCOUNTER
----- Message from Berenice Ambrosio sent at 9/27/2023 12:37 PM CDT -----  Regarding: pt refill  Name of Who is Calling:Pt         What is the request in detail: Pt needs script Sildenafil 50 mg transferred to another pharmacy due to previous pharmacy closing down. Please send to [facility]            Can the clinic reply by [facility]: no         What Number to Call Back if not in [facility]:   [id number] - [place] - [address] AT [address] [address]  Phone: [phone] Fax: [phone]    Telephone Information:  Mobile          [phone]

## 2023-10-11 DIAGNOSIS — E03.9 HYPOTHYROIDISM, UNSPECIFIED TYPE: ICD-10-CM

## 2023-10-11 RX ORDER — LEVOTHYROXINE SODIUM 150 UG/1
150 TABLET ORAL
Qty: 90 TABLET | Refills: 3 | Status: SHIPPED | OUTPATIENT
Start: 2023-10-11

## 2023-10-12 NOTE — TELEPHONE ENCOUNTER
No care due was identified.  [facility] Embedded Care Due Messages. Reference number: [id number].   10/11/2023 8:43:17 PM CDT

## 2023-10-12 NOTE — TELEPHONE ENCOUNTER
Refill Decision Note   Jeff Cameron  is requesting a refill authorization.  Brief Assessment and Rationale for Refill:  Approve     Medication Therapy Plan:  FREE T4-WNL    Medication Reconciliation Completed: No   Comments:     No Care Gaps recommended.     Note composed:8:47 PM 10/11/2023

## 2024-08-02 ENCOUNTER — TELEPHONE (OUTPATIENT)
Dept: INTERNAL MEDICINE | Facility: CLINIC | Age: 54
End: 2024-08-02
Payer: COMMERCIAL

## 2024-08-02 NOTE — TELEPHONE ENCOUNTER
Called pt and spoke to him. Pt states this is a new symptom. Pt states his fingers on his right hand are numb. Pt compares it to sleeping on your hand and it feeling numb. Advised pt to go to urgent care or the emergency room for evaluation. Pt states he will go straight to urgent care now.

## 2024-08-02 NOTE — TELEPHONE ENCOUNTER
Called pt and no answer. Left vm for pt stating that he should go to emergency room for evaluation.

## 2024-08-02 NOTE — TELEPHONE ENCOUNTER
----- Message from Rachel Osborne sent at 8/2/2024  2:29 PM CDT -----  Contact: Jeff  Patient is calling stating right hand has gone numb. Patient need a callback  [phone] to assist

## 2024-09-03 NOTE — TELEPHONE ENCOUNTER
No care due was identified.  [facility] Embedded Care Due Messages. Reference number: [id number].   9/03/2024 12:49:48 PM CDT

## 2024-09-04 RX ORDER — SILDENAFIL 50 MG/1
TABLET, FILM COATED ORAL
Qty: 10 TABLET | Refills: 2 | Status: SHIPPED | OUTPATIENT
Start: 2024-09-04

## 2024-09-04 NOTE — TELEPHONE ENCOUNTER
Refill Decision Note   Jeff Cameron  is requesting a refill authorization.  Brief Assessment and Rationale for Refill:  Approve     Medication Therapy Plan:        Comments:     Note composed:7:25 AM 09/04/2024

## 2025-02-11 RX ORDER — SILDENAFIL 50 MG/1
TABLET, FILM COATED ORAL
Qty: 10 TABLET | Refills: 2 | Status: SHIPPED | OUTPATIENT
Start: 2025-02-11

## 2025-02-11 NOTE — TELEPHONE ENCOUNTER
----- Message from Penny sent at 2/11/2025  9:44 AM CST -----  Type:  RX Refill Request    Who Called: pt  Refill or New Rx:refill  RX Name and Strength:sildenafiL (VIAGRA) 50 MG [id number]/4/2024-NoSig: TAKE 1 TABLET BY MOUTH ONCE DAILY AS NEEDED FOR SEXUAL ACTIVITYSent to pharmacy as: sildenafiL (VIAGRA) 50 MG tabletClass: NormalOrder: [id number] for Ordering: Accepted by Wilber Ortiz MD on 9/4/2024  8:17 AMDate/Time Signed: 9/4/2024 07:26E-Prescribing Status: Receipt confirmed by pharmacy (9/4/2024  7:27 AM CDT)   Preferred Pharmacy with phone number:  [id number] [address] [address]  Phone: [phone] Fax: [phone]  Local or Mail Order:local  Ordering Provider:Diana  Would the patient rather a call back or a response via [facility]? call  Best Call Back Number:[phone]    Additional Information: requesting a refill for medication asap

## 2025-02-11 NOTE — TELEPHONE ENCOUNTER
Care Due:                  Date            Visit Type   Department     Provider  --------------------------------------------------------------------------------                                EP -                              PRIMARY      [facility] INTERNAL  Last Visit: 08-      CARE ([facility])   MEDICINE       Wilber Ortiz  Next Visit: None Scheduled  None         None Found                                                            Last  Test          Frequency    Reason                     Performed    Due Date  --------------------------------------------------------------------------------    Office Visit  15 months..  levothyroxine............  08-   10-    TSH.........  12 months..  levothyroxine............  08- 07-    [employer] Embedded Care Due Messages. Reference number: [id number].   2/11/2025 9:51:48 AM CST

## 2025-03-05 ENCOUNTER — OFFICE VISIT (OUTPATIENT)
Dept: INTERNAL MEDICINE | Facility: CLINIC | Age: 55
End: 2025-03-05

## 2025-03-05 VITALS
OXYGEN SATURATION: 96 % | TEMPERATURE: 98 F | SYSTOLIC BLOOD PRESSURE: 118 MMHG | DIASTOLIC BLOOD PRESSURE: 70 MMHG | WEIGHT: 263.69 LBS | HEART RATE: 79 BPM | BODY MASS INDEX: 31.27 KG/M2

## 2025-03-05 DIAGNOSIS — Z12.11 COLON CANCER SCREENING: ICD-10-CM

## 2025-03-05 DIAGNOSIS — Z85.820 HISTORY OF MELANOMA: ICD-10-CM

## 2025-03-05 DIAGNOSIS — Z00.00 ROUTINE GENERAL MEDICAL EXAMINATION AT HEALTH CARE FACILITY: Primary | ICD-10-CM

## 2025-03-05 DIAGNOSIS — F41.9 ANXIETY: ICD-10-CM

## 2025-03-05 DIAGNOSIS — E06.3 HYPOTHYROIDISM DUE TO HASHIMOTO THYROIDITIS: ICD-10-CM

## 2025-03-05 DIAGNOSIS — I10 ESSENTIAL HYPERTENSION: ICD-10-CM

## 2025-03-05 PROCEDURE — 99999 PR PBB SHADOW E&M-EST. PATIENT-LVL III: CPT | Mod: PBBFAC,,, | Performed by: INTERNAL MEDICINE

## 2025-03-05 PROCEDURE — 99213 OFFICE O/P EST LOW 20 MIN: CPT | Mod: PBBFAC,PO | Performed by: INTERNAL MEDICINE

## 2025-03-05 PROCEDURE — 99396 PREV VISIT EST AGE 40-64: CPT | Mod: S$PBB,,, | Performed by: INTERNAL MEDICINE

## 2025-03-05 RX ORDER — LISINOPRIL 10 MG/1
10 TABLET ORAL
COMMUNITY
Start: 2024-11-20

## 2025-03-05 RX ORDER — QUETIAPINE FUMARATE 200 MG/1
200 TABLET, FILM COATED ORAL NIGHTLY
Qty: 90 TABLET | Refills: 3 | Status: SHIPPED | OUTPATIENT
Start: 2025-03-05

## 2025-03-05 RX ORDER — METHYLPHENIDATE HYDROCHLORIDE 20 MG/1
20 TABLET ORAL EVERY MORNING
COMMUNITY
Start: 2024-11-20 | End: 2025-03-05

## 2025-03-05 RX ORDER — QUETIAPINE FUMARATE 200 MG/1
200 TABLET, FILM COATED ORAL NIGHTLY
COMMUNITY
Start: 2025-01-27 | End: 2025-03-05 | Stop reason: SDUPTHER

## 2025-03-05 NOTE — PROGRESS NOTES
Subjective:       Patient ID: Jeff Cameron is a 54 y.o. male.    Chief Complaint: Follow-up      HPI:  History of Present Illness    Patient presents today for follow up    He has a history of melanoma discovered incidentally during a dermatology visit for rosacea management. Colonoscopy in 2023 was incomplete due to inadequate bowel preparation.    He continues Lisinopril for blood pressure management with reported effectiveness. He also takes Seroquel 200mg (not extended release) and Synthroid. He discontinued Ritalin due to adverse effects. He has a home blood pressure cuff but expresses concerns about the accuracy of readings compared to office measurements.    He is a former smoker with history of >1.5 packs per day, who quit by transitioning through smokeless tobacco. He denies current tobacco or alcohol use.         Review of Systems   Constitutional:  Negative for fever and unexpected weight change.   HENT:  Negative for hearing loss, postnasal drip and rhinorrhea.    Eyes:  Negative for pain and visual disturbance.   Respiratory:  Negative for cough, shortness of breath and wheezing.    Cardiovascular:  Negative for chest pain and palpitations.   Gastrointestinal:  Negative for constipation, diarrhea, nausea and vomiting.   Genitourinary:  Negative for dysuria and hematuria.   Musculoskeletal:  Negative for arthralgias, back pain, myalgias and neck stiffness.   Skin:  Negative for pallor and rash.   Neurological:  Negative for seizures, syncope and headaches.   Hematological:  Negative for adenopathy.   Psychiatric/Behavioral:  Negative for dysphoric mood. The patient is not nervous/anxious.        Objective:   /70   Pulse 79   Temp 97.6 °F (36.4 °C) (Tympanic)   Wt 119.6 kg (263 lb 10.7 oz)   SpO2 96%   BMI 31.27 kg/m²      Physical Exam  Vitals reviewed.   Constitutional:       General: He is not in acute distress.     Appearance: He is well-developed.   HENT:      Head: Normocephalic  and atraumatic.      Right Ear: Tympanic membrane and ear canal normal.      Left Ear: Tympanic membrane and ear canal normal.   Eyes:      Pupils: Pupils are equal, round, and reactive to light.   Neck:      Thyroid: No thyromegaly.      Vascular: No JVD.   Cardiovascular:      Rate and Rhythm: Normal rate and regular rhythm.      Heart sounds: Normal heart sounds. No murmur heard.     No friction rub. No gallop.   Pulmonary:      Effort: Pulmonary effort is normal.      Breath sounds: Normal breath sounds. No wheezing or rales.   Abdominal:      General: Bowel sounds are normal. There is no distension.      Palpations: Abdomen is soft.      Tenderness: There is no abdominal tenderness. There is no guarding or rebound.   Musculoskeletal:         General: Normal range of motion.      Cervical back: Normal range of motion and neck supple.   Lymphadenopathy:      Cervical: No cervical adenopathy.   Skin:     General: Skin is warm and dry.      Findings: No rash.   Neurological:      General: No focal deficit present.      Mental Status: He is alert and oriented to person, place, and time.      Cranial Nerves: No cranial nerve deficit.      Deep Tendon Reflexes: Reflexes are normal and symmetric.   Psychiatric:         Mood and Affect: Mood normal.         Judgment: Judgment normal.             Assessment:       1. Routine general medical examination at health care facility    2. Hypothyroidism due to Hashimoto thyroiditis    3. Anxiety    4. History of melanoma    5. Essential hypertension    6. Colon cancer screening        Plan:   History of melanoma  Preauricular.  Mohs removal in 2024  Jeff was seen today for follow-up.    Diagnoses and all orders for this visit:    Routine general medical examination at health care facility  Comments:  Focus on good health habits, low carb diet, regular exercise, seatbelt use. sunscreen use    Hypothyroidism due to Hashimoto thyroiditis  Comments:  update tsh, continue  meds    Anxiety    History of melanoma    Essential hypertension  Comments:  continue current regimen at this tiem.    Colon cancer screening  Comments:  Needs updated scope, will contact us when insurance activates      Assessment & Plan    HYPERTENSION:  - Evaluated the patient's blood pressure, currently well-controlled at 118/70 on Lisinopril.  - Will maintain current Lisinopril regimen due to upcoming job change and insurance transition, despite considering discontinuation.  - Explained rationale for potential future discontinuation of Lisinopril if blood pressure remains controlled.  - Continued Lisinopril at current dose.  - Instructed the patient to contact the office in 2-3 weeks to schedule a nurse visit for blood pressure recheck if deciding to discontinue Lisinopril in the future.  - Noted that the patient has a home blood pressure cuff but does not trust the readings compared to those taken at the physician's office.    ROSACEA:  - Noted that the patient has rosacea that flares up occasionally.  - Continued the current prescription medication for rosacea management.    MEDICATIONS/SUPPLEMENTS:  - Continued Seroquel 200 mg (non-extended release).  - Noted that the patient is under the care of a psychiatrist who has been managing their medication.    THYROID DISORDER:  - Continued Synthroid 125 mcg.  - Noted possible recent dosage change from 150 to 125 mcg.    MELANOMA:  - Noted recent melanoma history, emphasizing importance of regular dermatology follow-ups.  - Recommend annual dermatology check-ups due to history of melanoma.  - Acknowledged that the patient had surgery for melanoma in the past and followed the post-surgery protocol.  - Noted that the patient has not had regular follow-ups with dermatology due to lack of insurance, but plans to resume once new insurance is in place.    SMOKING CESSATION:  - Acknowledged successful smoking cessation.  - Noted that the patient reports not smoking for  years and no longer using smokeless tobacco.  - Recognized that the patient quit smoking by transitioning to smokeless tobacco and then quitting altogether.  - Patient to continue current healthy lifestyle choices, including abstaining from smoking and alcohol.    COLON CANCER SCREENING:  - Recognized need for repeat colonoscopy due to inadequate visualization in 2023 procedure.  - Discussed importance of completing colonoscopy for proper colon cancer screening.    FOLLOW UP:  - Deferred updated lab work and preventive screenings pending new insurance coverage.  - Follow up once new insurance is active to schedule: Updated lab work including cholesterol and prostate labs, and repeat colonoscopy.         Follow up in about 1 year (around 3/5/2026).    This note was generated with the assistance of ambient listening technology. Verbal consent was obtained by the patient and accompanying visitor(s) for the recording of patient appointment to facilitate this note

## 2025-03-05 NOTE — TELEPHONE ENCOUNTER
No care due was identified.  [facility] Embedded Care Due Messages. Reference number: [id number].   3/05/2025 2:28:20 PM CST

## 2025-05-05 NOTE — TELEPHONE ENCOUNTER
Care Due:                  Date            Visit Type   Department     Provider  --------------------------------------------------------------------------------                                MYCHART                              ANNUAL                              CHECKUP/PHY  [facility] INTERNAL  Last Visit: 03-      [facility]       Wilber Ortiz  Next Visit: None Scheduled  None         None Found                                                            Last  Test          Frequency    Reason                     Performed    Due Date  --------------------------------------------------------------------------------    TSH.........  12 months..  levothyroxine............  08- 07-    [facility] Embedded Care Due Messages. Reference number: [id number].   5/05/2025 1:37:52 PM CDT

## 2025-05-05 NOTE — TELEPHONE ENCOUNTER
----- Message from Jewell sent at 5/5/2025  1:20 PM CDT -----  Contact: [phone]  Would like to receive medical advice.Would they like a call back or a response via [facility]:  call backAdditional information:  Jeff is calling to speak to the provider or staff on behalf of the RX levothyroxine (SYNTHROID) 150 MCG tablet. Jeff did not go into much detail just stated he was calling to speak to the provider's office on behalf of the medication. Please call Jeff back for advice

## 2025-05-06 RX ORDER — SILDENAFIL 50 MG/1
TABLET, FILM COATED ORAL
Qty: 10 TABLET | Refills: 2 | Status: SHIPPED | OUTPATIENT
Start: 2025-05-06

## 2025-05-06 NOTE — TELEPHONE ENCOUNTER
Refill Routing Note   Medication(s) are not appropriate for processing by [facility] for the following reason(s):        No active prescription written by provider    [facility] action(s):  Defer   Requires labs : Yes             Appointments  past 12m or future 3m with PCP    Date Provider   Last Visit   3/5/2025 Wilber Ortiz MD   Next Visit   Visit date not found Wilber Ortiz MD   ED visits in past 90 days: 0        Note composed:10:09 PM 05/05/2025

## 2025-05-21 DIAGNOSIS — I10 ESSENTIAL HYPERTENSION: ICD-10-CM
